# Patient Record
Sex: FEMALE | Race: WHITE | Employment: OTHER | URBAN - METROPOLITAN AREA
[De-identification: names, ages, dates, MRNs, and addresses within clinical notes are randomized per-mention and may not be internally consistent; named-entity substitution may affect disease eponyms.]

---

## 2021-11-19 ENCOUNTER — TELEPHONE (OUTPATIENT)
Dept: FAMILY MEDICINE CLINIC | Age: 76
End: 2021-11-19

## 2021-11-19 NOTE — TELEPHONE ENCOUNTER
I called the office yesterday to make sure this happened and now the appt is gone. The 10:30 appt was just seen today. Move her appt out 2 - 3 weeks and put Alok Devries in that spot.   Thanks

## 2021-11-19 NOTE — TELEPHONE ENCOUNTER
QUESTIONS  Information for Provider? pt's son Rebecca Kendall is calling and needs to   schedule a new pt appt with Dr. Vita Alexander for pt.  He states she text him and told him this was ok to schedule pt for Monday at 11/22 at 11:20      Please advise

## 2021-11-19 NOTE — TELEPHONE ENCOUNTER
----- Message from Lit Baird sent at 11/18/2021  4:24 PM EST -----  Subject: Message to Provider    QUESTIONS  Information for Provider? pt's son Tito Liang is calling and needs to   schedule a new pt appt with Dr. Jose Luis Rose for pt. He states she text him and   told him this was ok to schedule pt for Monday at 11/22 at 11:20  ---------------------------------------------------------------------------  --------------  [address]  What is the best way for the office to contact you? OK to leave message on   voicemail  Preferred Call Back Phone Number? [phone]  ---------------------------------------------------------------------------  --------------  SCRIPT ANSWERS  Relationship to Patient?  Self

## 2021-11-21 NOTE — PROGRESS NOTES
oxy  Subjective:      Patient ID: Faisal Leiva 78 y.o. female. There were no encounter diagnoses. HPI    Sharlot Osgood is presenting as a new patient. She lives in [facility] [place] but can no longer be alone; her son is in process of moving her to [place]. 2 weeks ago she developed pedal edema. Was not eating a high Na.diet. She developed worsening of baseline dyspnea on exertion but no PND, orthopnea, palpitations, chest pain, light headedness, claudication. Her pulse ox has stayed stable in the lower 90s on 3 L NC. Was told she may have had a mild MI in the past.  She never had an angiogram or stent. She had 2 normal stress tests; does not recall last test.  Her cardiologist saw her 2 weeks ago. Did not do EKG. Just listened to her heart, told her to eat less salt and started lasix. Had an echo many years ago; does not know results. She was never told she has CHF. The edema is better but not gone and she is still short of breath  Swelling is a bit better in the am but not gone. Is not taking NSAIDS. Had labs in October that were fine; cardiology ordered follow up labs in a few weeks. Pulse ox at home is stable in the 90s; is on oxygen continuously at home. Has COPD; quit smoking 10 years ago. Has a pulmonologist in [facility] [place]  Weight was up 3 lbs when she saw the cardiologist; is down 6 lbs today compared to the cardiologist scale. Has right lower ext arterial stent placed 10 years ago. She has no claudication. family would chair lift; she has no joint pain or back pain but has trouble getting out of a chair.   Would like transport     Vaccines: she had COVID, flu and pneumococcal.     Outpatient Medications Marked as Taking for the 11/22/21 encounter (Office Visit) with Linus Gaona MD   Medication Sig Dispense Refill    aspirin 81 MG EC tablet Take by mouth      ipratropium-albuterol (DUONEB) 0.5-2.5 (3) MG/3ML SOLN nebulizer solution inhale one vial (3ml) via nebulizer every 6 hours      lisinopril (PRINIVIL;ZESTRIL) 5 MG tablet TAKE ONE TABLET BY MOUTH DAILY      metoprolol tartrate (LOPRESSOR) 25 MG tablet TAKE ONE-HALF TABLET BY MOUTH TWICE DAILY      OXYGEN Oxygen Quantity: 0 Refills: 0 Ordered: 23-Jan-2019 DO Active      simvastatin (ZOCOR) 40 MG tablet TAKE ONE TABLET BY MOUTH AT BEDTIME      spironolactone (ALDACTONE) 25 MG tablet TAKE ONE TABLET BY MOUTH EVERY DAY      alendronate (FOSAMAX) 70 MG tablet TAKE ONE TABLET BY MOUTH ONCE A WEEK      fluticasone-vilanterol (BREO ELLIPTA) 100-25 MCG/INH AEPB inhaler Inhale into the lungs      albuterol sulfate (PROAIR RESPICLICK) 870 (90 Base) MCG/ACT aerosol powder inhalation Inhale 108 mcg into the lungs      isosorbide dinitrate (ISORDIL) 30 MG tablet TAKE ONE TABLET BY MOUTH DAILY      furosemide (LASIX) 20 MG tablet TAKE ONE TABLET BY MOUTH EVERY DAY      magnesium oxide (MAG-OX) 400 MG tablet Take 400 mg by mouth daily          No Known Allergies    Patient Active Problem List   Diagnosis    Primary hypertension    Other emphysema ([address]    Pure hypercholesterolemia    PVD (peripheral vascular disease) ([address]    Age-related osteoporosis without current pathological fracture       Past Medical History:   Diagnosis Date    Age-related osteoporosis without current pathological fracture 11/22/2021    Other emphysema ([address] 11/22/2021    Primary hypertension 11/22/2021    Pure hypercholesterolemia 11/22/2021    PVD (peripheral vascular disease) ([address] 11/22/2021       Past Surgical History:   Procedure Laterality Date    ANKLE FRACTURE SURGERY Left     HIP FRACTURE SURGERY Left     PERIPHERAL PERCUTANEOUS ARTERIAL INTERVENTION Right 2010    lower        Family History   Problem Relation Age of Onset    Heart Attack Mother 46    Heart Failure Mother     Heart Failure Father 80    Coronary Art Dis Brother        Social History     Tobacco Use    Smoking status: Former Smoker     Packs/day: 1.00     Years: failure, unspecified heart failure type [facility])   Jess Gtz MD, [facility] system    Comprehensive Metabolic Panel    CBC    BRAIN NATRIURETIC PEPTIDE (BNP)    TSH with Reflex    ECHO Complete 2D W Doppler W Color    Increase lasix to 40 mg qd; elevate legs every 2 to 3 hours, support hose. Decrease lasix to 20 mg once edema resolved. Daily wts. 2. Primary hypertension  At goal < 130/80 with lisinopril, aldactone, lopressor and lasix. Depending on echo consider consider titrating ACE and BB    3. Other emphysema ([facility]  DME Order for Home Oxygen as OP   4. Pure hypercholesterolemia  Continue statin. 5. PVD (peripheral vascular disease) ([facility])  Continue BP and lipid control. Asymptomatic but not very active. 6. Age-related osteoporosis without current pathological fracture  Continue Fosamax, Ca, D    7. Debilitated patient  Lift Chair MISC    Misc. Devices [id number] [facility]) [facility]    ECHO Complete 2D W Doppler W Color   8. Pedal edema     9. Hypertensive heart disease with heart failure (HCC)   ECHO Complete 2D W Doppler W Color    EKG 12 Lead   10. Chronic hypoxemic respiratory failure ([facility]  DME Order for Home Oxygen as OP   11. Hx smoking - discussed lung cancer screening and information provided. Will consider. Would be high risk for surgical tx. Plan:    old records requested. Side effects of current medications reviewed and questions answered. See me or Dr. Pimentel Service in 2 to 3 weeks. See me in 3 months if sees Dr. Pimentel Service in a few weeks.

## 2021-11-22 ENCOUNTER — OFFICE VISIT (OUTPATIENT)
Dept: FAMILY MEDICINE CLINIC | Age: 76
End: 2021-11-22
Payer: MEDICARE

## 2021-11-22 VITALS
OXYGEN SATURATION: 90 % | WEIGHT: 119.4 LBS | BODY MASS INDEX: 23.44 KG/M2 | SYSTOLIC BLOOD PRESSURE: 110 MMHG | DIASTOLIC BLOOD PRESSURE: 64 MMHG | HEIGHT: 60 IN | TEMPERATURE: 97.3 F | RESPIRATION RATE: 18 BRPM | HEART RATE: 68 BPM

## 2021-11-22 DIAGNOSIS — Z87.891 HISTORY OF SMOKING 30 OR MORE PACK YEARS: ICD-10-CM

## 2021-11-22 DIAGNOSIS — J43.8 OTHER EMPHYSEMA (HCC): ICD-10-CM

## 2021-11-22 DIAGNOSIS — R60.0 PEDAL EDEMA: ICD-10-CM

## 2021-11-22 DIAGNOSIS — I11.0 HYPERTENSIVE HEART DISEASE WITH HEART FAILURE (HCC): ICD-10-CM

## 2021-11-22 DIAGNOSIS — E78.00 PURE HYPERCHOLESTEROLEMIA: ICD-10-CM

## 2021-11-22 DIAGNOSIS — I50.9 ACUTE ON CHRONIC CONGESTIVE HEART FAILURE, UNSPECIFIED HEART FAILURE TYPE (HCC): ICD-10-CM

## 2021-11-22 DIAGNOSIS — I50.9 ACUTE ON CHRONIC CONGESTIVE HEART FAILURE, UNSPECIFIED HEART FAILURE TYPE (HCC): Primary | ICD-10-CM

## 2021-11-22 DIAGNOSIS — I73.9 PVD (PERIPHERAL VASCULAR DISEASE) (HCC): ICD-10-CM

## 2021-11-22 DIAGNOSIS — J96.11 CHRONIC HYPOXEMIC RESPIRATORY FAILURE (HCC): ICD-10-CM

## 2021-11-22 DIAGNOSIS — M81.0 AGE-RELATED OSTEOPOROSIS WITHOUT CURRENT PATHOLOGICAL FRACTURE: ICD-10-CM

## 2021-11-22 DIAGNOSIS — R53.81 DEBILITATED PATIENT: ICD-10-CM

## 2021-11-22 DIAGNOSIS — I10 PRIMARY HYPERTENSION: ICD-10-CM

## 2021-11-22 LAB
A/G RATIO: 1.7 (ref 1.1–2.2)
ALBUMIN SERPL-MCNC: 4.5 G/DL (ref 3.4–5)
ALP BLD-CCNC: 65 U/L (ref 40–129)
ALT SERPL-CCNC: 9 U/L (ref 10–40)
ANION GAP SERPL CALCULATED.3IONS-SCNC: 11 MMOL/L (ref 3–16)
AST SERPL-CCNC: 20 U/L (ref 15–37)
BILIRUB SERPL-MCNC: 0.9 MG/DL (ref 0–1)
BUN BLDV-MCNC: 22 MG/DL (ref 7–20)
CALCIUM SERPL-MCNC: 9.9 MG/DL (ref 8.3–10.6)
CHLORIDE BLD-SCNC: 92 MMOL/L (ref 99–110)
CO2: 36 MMOL/L (ref 21–32)
CREAT SERPL-MCNC: 0.6 MG/DL (ref 0.6–1.2)
GFR AFRICAN AMERICAN: >60
GFR NON-AFRICAN AMERICAN: >60
GLUCOSE BLD-MCNC: 102 MG/DL (ref 70–99)
HCT VFR BLD CALC: 43.1 % (ref 36–48)
HEMOGLOBIN: 13.7 G/DL (ref 12–16)
MCH RBC QN AUTO: 32.1 PG (ref 26–34)
MCHC RBC AUTO-ENTMCNC: 31.6 G/DL (ref 31–36)
MCV RBC AUTO: 101.5 FL (ref 80–100)
PDW BLD-RTO: 15 % (ref 12.4–15.4)
PLATELET # BLD: 222 K/UL (ref 135–450)
PMV BLD AUTO: 10.1 FL (ref 5–10.5)
POTASSIUM SERPL-SCNC: 4.5 MMOL/L (ref 3.5–5.1)
PRO-BNP: 452 PG/ML (ref 0–449)
RBC # BLD: 4.25 M/UL (ref 4–5.2)
SODIUM BLD-SCNC: 139 MMOL/L (ref 136–145)
TOTAL PROTEIN: 7.2 G/DL (ref 6.4–8.2)
TSH REFLEX: 0.98 UIU/ML (ref 0.27–4.2)
WBC # BLD: 8.2 K/UL (ref 4–11)

## 2021-11-22 PROCEDURE — 93000 ELECTROCARDIOGRAM COMPLETE: CPT | Performed by: FAMILY MEDICINE

## 2021-11-22 PROCEDURE — 99204 OFFICE O/P NEW MOD 45 MIN: CPT | Performed by: FAMILY MEDICINE

## 2021-11-22 RX ORDER — FUROSEMIDE 20 MG/1
20-40 TABLET ORAL DAILY
Qty: 180 TABLET | Refills: 0 | Status: ON HOLD | OUTPATIENT
Start: 2021-11-22 | End: 2022-07-01 | Stop reason: SDUPTHER

## 2021-11-22 RX ORDER — FUROSEMIDE 20 MG/1
TABLET ORAL
COMMUNITY
Start: 2021-11-18 | End: 2021-11-22 | Stop reason: SDUPTHER

## 2021-11-22 RX ORDER — IPRATROPIUM BROMIDE AND ALBUTEROL SULFATE 2.5; .5 MG/3ML; MG/3ML
SOLUTION RESPIRATORY (INHALATION)
COMMUNITY
Start: 2021-11-02 | End: 2021-11-22 | Stop reason: SDUPTHER

## 2021-11-22 RX ORDER — LISINOPRIL 5 MG/1
5 TABLET ORAL NIGHTLY
Status: ON HOLD | COMMUNITY
Start: 2021-10-14 | End: 2022-07-01 | Stop reason: HOSPADM

## 2021-11-22 RX ORDER — FLUTICASONE FUROATE AND VILANTEROL 100; 25 UG/1; UG/1
POWDER RESPIRATORY (INHALATION)
COMMUNITY
End: 2022-06-01

## 2021-11-22 RX ORDER — IPRATROPIUM BROMIDE AND ALBUTEROL SULFATE 2.5; .5 MG/3ML; MG/3ML
SOLUTION RESPIRATORY (INHALATION)
Qty: 360 ML | Refills: 5 | Status: SHIPPED | OUTPATIENT
Start: 2021-11-22 | End: 2022-03-14 | Stop reason: SDUPTHER

## 2021-11-22 RX ORDER — ASPIRIN 81 MG/1
TABLET ORAL
COMMUNITY

## 2021-11-22 RX ORDER — WHEELCHAIR
1 EACH MISCELLANEOUS DAILY
Qty: 1 EACH | Refills: 0 | Status: SHIPPED | OUTPATIENT
Start: 2021-11-22 | End: 2022-06-01

## 2021-11-22 RX ORDER — ALENDRONATE SODIUM 70 MG/1
TABLET ORAL
COMMUNITY
Start: 2021-10-11 | End: 2021-12-02 | Stop reason: SDUPTHER

## 2021-11-22 RX ORDER — SPIRONOLACTONE 25 MG/1
TABLET ORAL
COMMUNITY
Start: 2021-09-20 | End: 2022-04-04 | Stop reason: SDUPTHER

## 2021-11-22 RX ORDER — SIMVASTATIN 40 MG
TABLET ORAL
COMMUNITY
Start: 2021-11-01

## 2021-11-22 RX ORDER — MAGNESIUM OXIDE 400 MG/1
400 TABLET ORAL DAILY
COMMUNITY

## 2021-11-22 RX ORDER — ISOSORBIDE DINITRATE 30 MG/1
TABLET ORAL
COMMUNITY
Start: 2021-09-18 | End: 2022-03-15 | Stop reason: SDUPTHER

## 2021-11-22 ASSESSMENT — PATIENT HEALTH QUESTIONNAIRE - PHQ9
SUM OF ALL RESPONSES TO PHQ9 QUESTIONS 1 & 2: 0
1. LITTLE INTEREST OR PLEASURE IN DOING THINGS: 0
SUM OF ALL RESPONSES TO PHQ QUESTIONS 1-9: 0
2. FEELING DOWN, DEPRESSED OR HOPELESS: 0

## 2021-11-22 ASSESSMENT — ENCOUNTER SYMPTOMS
DIARRHEA: 0
SHORTNESS OF BREATH: 1
WHEEZING: 0
CONSTIPATION: 0
COUGH: 0

## 2021-11-22 NOTE — PATIENT INSTRUCTIONS
Patient Education        Learning About Lung Cancer Screening  What is screening for lung cancer? Lung cancer screening is a way to find some lung cancers early, before a person has any symptoms of the cancer. Lung cancer screening may help those who have the highest risk for lung cancer--people age 48 and older who are or were heavy smokers. For most people, who aren't at increased risk, screening for lung cancer probably isn't helpful. Screening won't prevent cancer. And it may not find all lung cancers. Lung cancer screening may lower the risk of dying from lung cancer in a small number of people. How is it done? Lung cancer screening is done with a low-dose CT (computed tomography) scan. A CT scan uses X-rays, or radiation, to make detailed pictures of your body. Experts recommend that screening be done in medical centers that focus on finding and treating lung cancer. Who is screening recommended for? Lung cancer screening is recommended for people age 48 and older who are or were heavy smokers. That means people with a smoking history of at least 20 pack years. A pack year is a way to measure how heavy a smoker you are or were. To figure out your pack years, multiply how many packs a day on average (assuming 20 cigarettes per pack) you have smoked by how many years you have smoked. For example:  · If you smoked 1 pack a day for 20 years, that's 1 times 20. So you have a smoking history of 20 pack years. · If you smoked 2 packs a day for 10 years, that's 2 times 10. So you have a smoking history of 20 pack years. Experts agree that screening is for people who have a high risk of lung cancer. But experts don't agree on what high risk means. Some say people age 48 or older with at least a 20-pack-year smoking history are high risk. Others say it's people age 54 or older with a 30-pack-year history. To see if you could benefit from screening, first find out if you are at high risk for lung cancer. Your doctor can help you decide your lung cancer risk. What are the risks of screening? CT screening for lung cancer isn't perfect. It can show an abnormal result when it turns out there wasn't any cancer. This is called a false-positive result. This means you may need more tests to make sure you don't have cancer. These tests can be harmful and cause a lot of worry. These tests may include more CT scans and invasive testing like a lung biopsy. In a biopsy, the doctor takes a sample of tissue from inside your lung so it can be looked at under a microscope. A biopsy is the only way to tell if you have lung cancer. If the biopsy finds cancer, you and your doctor will have to decide how or whether to treat it. Some lung cancers found on CT scans are harmless and would not have caused a problem if they had not been found through screening. But because doctors can't tell which ones will turn out to be harmless, most will be treated. This means that you may get treatment--including surgery, radiation, or chemotherapy--that you don't need. There is a risk of damage to cells or tissue from being exposed to radiation, including the small amounts used in CTs, X-rays, and other medical tests. Over time, exposure to radiation may cause cancer and other health problems. But in most cases, the risk of getting cancer from being exposed to small amounts of radiation is low. It's not a reason to avoid these tests for most people. What are the benefits of screening? Your scan may be normal (negative). For some people who are at higher risk, screening lowers the chance of dying of lung cancer. How much and how long you smoked helps to determine your risk level. Screening can find some cancers early, when treatment may be more likely to work. What happens after screening? The results of your CT scan will be sent to your doctor.  Someone from your care team will explain the results of your scan and answer any questions you may have. If you need any follow-up, he or she will help you understand what to do next. After a lung cancer screening, you can go back to your usual activities right away. A lung cancer screening test can't tell if you have lung cancer. If your results are positive, your doctor can't tell whether an abnormal finding is a harmless nodule, cancer, or something else without doing more tests. What can you do to help prevent lung cancer? Some lung cancers can't be prevented. But if you smoke, quitting smoking is the best step you can take to prevent lung cancer. If you want to quit, your doctor can recommend medicines or other ways to help. Follow-up care is a key part of your treatment and safety. Be sure to make and go to all appointments, and call your doctor if you are having problems. It's also a good idea to know your test results and keep a list of the medicines you take. Where can you learn more? Go to https://[employer]. org and sign in to your [employer] account. Enter T576 in the weeSPIN box to learn more about \"Learning About Lung Cancer Screening. \"     If you do not have an account, please click on the \"Sign Up Now\" link. Current as of: December 17, 2020               Content Version: 13.0  © 2006-2021 [employer]wise, Inc[employer]. Care instructions adapted under license by [facility] ([facility]). If you have questions about a medical condition or this instruction, always ask your healthcare professional. [place] [address] any warranty or liability for your use of this information.

## 2021-12-01 ENCOUNTER — PATIENT MESSAGE (OUTPATIENT)
Dept: FAMILY MEDICINE CLINIC | Age: 76
End: 2021-12-01

## 2021-12-02 RX ORDER — ALENDRONATE SODIUM 70 MG/1
TABLET ORAL
Qty: 4 TABLET | Refills: 3 | Status: SHIPPED | OUTPATIENT
Start: 2021-12-02 | End: 2022-04-01 | Stop reason: SDUPTHER

## 2021-12-02 NOTE — TELEPHONE ENCOUNTER
From: Mian De Anda  To: Dr. Taylor Raid: 12/1/2021 1:35 PM EST  Subject: Caterinaxaiver Amaro, thanks for the reply. The swelling is better. She took the 2 pills for a week and now is back down to one. She asked me to see if she could get a refill on her Alendronate 70mg and her Breo 100-25 mcg. If there is a better process for me to go through, let me know. I didn't realize she needed any refills when we were at your office. We use the [facility] in [place] on [address] Thank you!   Melissa Silveira

## 2021-12-08 ENCOUNTER — HOSPITAL ENCOUNTER (OUTPATIENT)
Dept: NON INVASIVE DIAGNOSTICS | Age: 76
Discharge: HOME | End: 2021-12-08
Payer: MEDICARE

## 2021-12-08 DIAGNOSIS — I11.0 HYPERTENSIVE HEART DISEASE WITH HEART FAILURE (HCC): ICD-10-CM

## 2021-12-08 DIAGNOSIS — J43.8 OTHER EMPHYSEMA (HCC): Primary | ICD-10-CM

## 2021-12-08 DIAGNOSIS — I50.9 ACUTE ON CHRONIC CONGESTIVE HEART FAILURE, UNSPECIFIED HEART FAILURE TYPE (HCC): ICD-10-CM

## 2021-12-08 DIAGNOSIS — R53.81 DEBILITATED PATIENT: ICD-10-CM

## 2021-12-08 LAB
LV EF: 60 %
LVEF MODALITY: NORMAL

## 2021-12-08 PROCEDURE — C8929 TTE W OR WO FOL WCON,DOPPLER: HCPCS

## 2021-12-09 ENCOUNTER — TELEPHONE (OUTPATIENT)
Dept: PULMONOLOGY | Age: 76
End: 2021-12-09

## 2021-12-09 DIAGNOSIS — J43.2 CENTRILOBULAR EMPHYSEMA (HCC): Primary | ICD-10-CM

## 2021-12-09 NOTE — TELEPHONE ENCOUNTER
Referred By: Angel Jama MD    Reason: emphysema  Previous Testing: echo 12/8/21    Insurance: [facility]    New Testing Needed: pft and cxr pended if appropriate     Appt Date/Time: 1/20/22 @ 10am

## 2022-01-04 ENCOUNTER — HOSPITAL ENCOUNTER (OUTPATIENT)
Dept: PULMONOLOGY | Age: 77
Discharge: HOME | End: 2022-01-04
Payer: MEDICARE

## 2022-01-04 ENCOUNTER — HOSPITAL ENCOUNTER (OUTPATIENT)
Dept: GENERAL RADIOLOGY | Age: 77
Discharge: HOME | End: 2022-01-04
Payer: MEDICARE

## 2022-01-04 VITALS — OXYGEN SATURATION: 94 %

## 2022-01-04 DIAGNOSIS — J43.2 CENTRILOBULAR EMPHYSEMA (HCC): ICD-10-CM

## 2022-01-04 PROCEDURE — 94060 EVALUATION OF WHEEZING: CPT

## 2022-01-04 PROCEDURE — 71046 X-RAY EXAM CHEST 2 VIEWS: CPT

## 2022-01-04 PROCEDURE — 94664 DEMO&/EVAL PT USE INHALER: CPT

## 2022-01-04 PROCEDURE — 94760 N-INVAS EAR/PLS OXIMETRY 1: CPT

## 2022-01-04 PROCEDURE — 6360000002 HC RX W HCPCS: Performed by: INTERNAL MEDICINE

## 2022-01-04 PROCEDURE — 94726 PLETHYSMOGRAPHY LUNG VOLUMES: CPT

## 2022-01-04 PROCEDURE — 94729 DIFFUSING CAPACITY: CPT

## 2022-01-04 RX ORDER — ALBUTEROL SULFATE 2.5 MG/3ML
2.5 SOLUTION RESPIRATORY (INHALATION) ONCE
Status: COMPLETED | OUTPATIENT
Start: 2022-01-04 | End: 2022-01-04

## 2022-01-04 RX ADMIN — ALBUTEROL SULFATE 2.5 MG: 2.5 SOLUTION RESPIRATORY (INHALATION) at 10:29

## 2022-01-11 ENCOUNTER — OFFICE VISIT (OUTPATIENT)
Dept: CARDIOLOGY CLINIC | Age: 77
End: 2022-01-11
Payer: MEDICARE

## 2022-01-11 VITALS
HEIGHT: 60 IN | HEART RATE: 98 BPM | SYSTOLIC BLOOD PRESSURE: 84 MMHG | OXYGEN SATURATION: 98 % | WEIGHT: 110.5 LBS | DIASTOLIC BLOOD PRESSURE: 52 MMHG | BODY MASS INDEX: 21.69 KG/M2

## 2022-01-11 DIAGNOSIS — I50.32 CHRONIC DIASTOLIC HEART FAILURE (HCC): Primary | ICD-10-CM

## 2022-01-11 DIAGNOSIS — I10 ESSENTIAL HYPERTENSION, BENIGN: ICD-10-CM

## 2022-01-11 DIAGNOSIS — I73.9 PAD (PERIPHERAL ARTERY DISEASE) (HCC): ICD-10-CM

## 2022-01-11 DIAGNOSIS — J43.9 PULMONARY EMPHYSEMA, UNSPECIFIED EMPHYSEMA TYPE (HCC): ICD-10-CM

## 2022-01-11 DIAGNOSIS — I47.1 PAT (PAROXYSMAL ATRIAL TACHYCARDIA) (HCC): ICD-10-CM

## 2022-01-11 DIAGNOSIS — E78.00 PURE HYPERCHOLESTEROLEMIA: ICD-10-CM

## 2022-01-11 DIAGNOSIS — I25.10 CORONARY ARTERY DISEASE INVOLVING NATIVE HEART WITHOUT ANGINA PECTORIS, UNSPECIFIED VESSEL OR LESION TYPE: ICD-10-CM

## 2022-01-11 PROCEDURE — 99204 OFFICE O/P NEW MOD 45 MIN: CPT | Performed by: INTERNAL MEDICINE

## 2022-01-11 RX ORDER — NITROGLYCERIN 0.4 MG/1
0.4 TABLET SUBLINGUAL EVERY 5 MIN PRN
COMMUNITY

## 2022-01-11 NOTE — PROGRESS NOTES
[address]  Advanced CHF/Pulmonary Hypertension   Cardiac Evaluation      Joana Campos  YOB: 1945    Date of visit: 1/11/22    Chief Complaint   Patient presents with    New Patient    Edema    Shortness of Breath          History of Present Illness:  Joana Campos is a 78 y.o. female who presents from referral from Lizzie Ramirez MD for consultation and management of acute on chronic congestive heart failure. Patient has a past medical history of PAT, PAD,  HTN and COPD w/ emphysema (on home O2). Of note, patient wore a cardiac event monitor in 7/2019 that showed predominately ST. Echo 12/2021 demonstrated an LVEF of 60%,  indeterminate diastolic function. There is an atrial septal aneurysm seen. Trivial mitral regurgitation. Mild aortic regurgitation. Moderate tricuspid regurgitation. Trivial pulmonic regurgitation present. Estimated pulmonary artery systolic pressure is at 69 mmHg assuming a right   atrial pressure of 3 mmHg. A bubble study was performed and shows evidence of right to left shunting consistent with a patent foramen ovale or atrial septal defect. Today she reports she is in the process of moving to [place]. She reports her PCP Lizzie Ramirez MD prescribed her lasix for increased BLE edema that has helped decrease swelling. Patient denies current edema, chest pain, palpitations, dizziness or syncope. Patient is taking all cardiac medications as prescribed and tolerates them well. She reports weight loss related to the lasix and spironolactone. She reports getting an Echo recently. She reports her previous Md ordered stress tests every 2 years. She reports she can not tolerate an exercise stress test. She reports she wears nasal cannula oxygen as needed at home. She reports shortness of breath even when wearing oxygen.        No Known Allergies  Current Outpatient Medications   Medication Sig Dispense Refill    nitroGLYCERIN (NITROSTAT) 0.4 MG SL tablet Place 0.4 mg under the tongue every 5 minutes as needed for Chest pain up to max of 3 total doses. If no relief after 1 dose, call 911.  alendronate (FOSAMAX) 70 MG tablet TAKE ONE TABLET BY MOUTH ONCE A WEEK 4 tablet 3    aspirin 81 MG EC tablet Take by mouth      lisinopril (PRINIVIL;ZESTRIL) 5 MG tablet TAKE ONE TABLET BY MOUTH DAILY      metoprolol tartrate (LOPRESSOR) 25 MG tablet TAKE ONE-HALF TABLET BY MOUTH TWICE DAILY      OXYGEN Oxygen Quantity: 0 Refills: 0 Ordered: 23-Jan-2019 DO Active      simvastatin (ZOCOR) 40 MG tablet TAKE ONE TABLET BY MOUTH AT BEDTIME      spironolactone (ALDACTONE) 25 MG tablet TAKE ONE TABLET BY MOUTH EVERY DAY      fluticasone-vilanterol (BREO ELLIPTA) 100-25 MCG/INH AEPB inhaler Inhale into the lungs      albuterol sulfate (PROAIR RESPICLICK) 340 (90 Base) MCG/ACT aerosol powder inhalation Inhale 108 mcg into the lungs      isosorbide dinitrate (ISORDIL) 30 MG tablet TAKE ONE TABLET BY MOUTH DAILY      magnesium oxide (MAG-OX) 400 MG tablet Take 400 mg by mouth daily      ipratropium-albuterol (DUONEB) 0.5-2.5 (3) MG/3ML SOLN nebulizer solution inhale one vial (3ml) via nebulizer every 6 hours 360 mL 5    furosemide (LASIX) 20 MG tablet Take 1-2 tablets by mouth daily (Patient taking differently: Take 20 mg by mouth See Admin Instructions Patient taking 1 tab daily, then 2 tabs daily, every other day) 180 tablet 0    Lift Chair MISC by Does not apply route 1 each 0    Misc. Devices [facility]) MISC 1 each by Does not apply route daily 1 each 0     No current facility-administered medications for this visit.        Past Medical History:   Diagnosis Date    Age-related osteoporosis without current pathological fracture 11/22/2021    Other emphysema ([address] 11/22/2021    Primary hypertension 11/22/2021    Pure hypercholesterolemia 11/22/2021    PVD (peripheral vascular disease) ([address] 11/22/2021     Past Surgical History:   Procedure Laterality Date    ANKLE FRACTURE SURGERY Left     HIP FRACTURE SURGERY Left     PERIPHERAL PERCUTANEOUS ARTERIAL INTERVENTION Right 2010    lower     Family History   Problem Relation Age of Onset    Heart Attack Mother 46    Heart Failure Mother     Heart Failure Father 80    Coronary Art Dis Brother      Social History     Socioeconomic History    Marital status:      Spouse name: Not on file    Number of children: Not on file    Years of education: Not on file    Highest education level: Not on file   Occupational History    Not on file   Tobacco Use    Smoking status: Former Smoker     Packs/day: 1.00     Years: 40.00     Pack years: 40.00     Start date:      Quit date:      Years since quittin.0    Smokeless tobacco: Never Used   Substance and Sexual Activity    Alcohol use: Yes     Alcohol/week: 7.0 standard drinks     Types: 7 Standard drinks or equivalent per week     Comment: one daily    Drug use: Never    Sexual activity: Not on file   Other Topics Concern    Not on file   Social History Narrative    Not on file     Social Determinants of Health     Financial Resource Strain:     Difficulty of Paying Living Expenses: Not on file   Food Insecurity:     Worried About Running Out of Food in the Last Year: Not on file    Ale of Food in the Last Year: Not on file   Transportation Needs:     Lack of Transportation (Medical): Not on file    Lack of Transportation (Non-Medical):  Not on file   Physical Activity:     Days of Exercise per Week: Not on file    Minutes of Exercise per Session: Not on file   Stress:     Feeling of Stress : Not on file   Social Connections:     Frequency of Communication with Friends and Family: Not on file    Frequency of Social Gatherings with Friends and Family: Not on file    Attends Faith Services: Not on file    Active Member of Clubs or Organizations: Not on file    Attends Club or Organization Meetings: Not on file    Marital Status: Not on file   Intimate Partner Violence:     Fear of Current or Ex-Partner: Not on file    Emotionally Abused: Not on file    Physically Abused: Not on file    Sexually Abused: Not on file   Housing Stability:     Unable to Pay for Housing in the Last Year: Not on file    Number of [place] in the Last Year: Not on file    Unstable Housing in the Last Year: Not on file       Review of Systems:   · Constitutional: there has been no unanticipated weight loss. There's been no change in energy level, sleep pattern, or activity level. · Eyes: No visual changes or diplopia. No scleral icterus. · ENT: No Headaches, hearing loss or vertigo. No mouth sores or sore throat. · Cardiovascular: Reviewed in HPI  · Respiratory: No cough or wheezing, no sputum production. No hematemesis. · Gastrointestinal: No abdominal pain, appetite loss, blood in stools. No change in bowel or bladder habits. · Genitourinary: No dysuria, trouble voiding, or hematuria. · Musculoskeletal:  No gait disturbance, weakness or joint complaints. · Integumentary: No rash or pruritis. · Neurological: No headache, diplopia, change in muscle strength, numbness or tingling. No change in gait, balance, coordination, mood, affect, memory, mentation, behavior. · Psychiatric: No anxiety, no depression. · Endocrine: No malaise, fatigue or temperature intolerance. No excessive thirst, fluid intake, or urination. No tremor. · Hematologic/Lymphatic: No abnormal bruising or bleeding, blood clots or swollen lymph nodes. · Allergic/Immunologic: No nasal congestion or hives. Physical Examination:    Vitals:    01/11/22 1424 01/11/22 1433   BP: (!) 84/50 (!) 84/52   Pulse: 98    SpO2: 98%    Weight: 110 lb 8 oz (50.1 kg)    Height: 5' (1.524 m)      Body mass index is 21.58 kg/m².      Wt Readings from Last 3 Encounters:   01/11/22 110 lb 8 oz (50.1 kg)   11/22/21 119 lb 6.4 oz (54.2 kg)     BP Readings from Last 3 Encounters: 01/11/22 (!) 84/52   11/22/21 110/64          Constitutional and General Appearance:   WD/WN in NAD  HEENT:  NC/AT  BIANKA  No problems with hearing  Skin:  Warm, dry  Respiratory:  · Normal excursion and expansion without use of accessory muscles  · Resp Auscultation: Normal breath sounds without dullness  Cardiovascular:  · The apical impulses not displaced  · Heart tones are crisp and normal  · Cervical veins are not engorged  · The carotid upstroke is normal in amplitude and contour without delay or bruit  · JVP less than 8 cm H2O  RRR with nl S1 and S2 without m,r,g  · Peripheral pulses are symmetrical and full  · There is no clubbing, cyanosis of the extremities. · No edema  · Femoral Arteries: 2+ and equal  · Pedal Pulses: 2+ and equal   Neck:  · No thyromegaly  Abdomen:  · No masses or tenderness  · Liver/Spleen: No Abnormalities Noted  Neurological/Psychiatric:  · Alert and oriented in all spheres  · Moves all extremities well  · Exhibits normal gait balance and coordination  · No abnormalities of mood, affect, memory, mentation, or behavior are noted    Labs were reviewed including labs from other hospital systems through [facility]. Cardiac testing was reviewed including echos, nuclear scans, cardiac catheterization, including from other hospital systems through [facility]. Assessment:      1. Chronic diastolic heart failure ([address]    2. Pure hypercholesterolemia    3. Coronary artery disease involving native heart without angina pectoris, unspecified vessel or lesion type    4. Pulmonary emphysema, unspecified emphysema type ([address]    5. Essential hypertension, benign    6. PAT (paroxysmal atrial tachycardia) ([address]    7. PAD (peripheral artery disease) ([address]          Plan:  1. Continue current cardiac medications  2. Labs: BNP, BMP   - Closest lab to you is [address]   3. Follow up with me in March 2022  4.  Decrease furosemide to 20 mg daily    - I will notify you if any other changes in medications  5. Recommend weighing yourself at the same time everyday and keeping a log of this    - Call the office if you notice a decrease in weight of 3 lbs in one day    QUALITY MEASURES  1. Tobacco Cessation Counseling: Yes  2. Retake of BP if >140/90:   NA  3. Documentation to PCP/referring for new patient:  Sent to PCP at close of office visit  4. CAD patient on anti-platelet: NA  5. CAD patient on STATIN therapy:  Yes  6. Patient with CHF and aFib on anticoagulation:  NA     This note was scribed in the presence of Mishel Haines MD by Floyd Perdomo RN. The scribe's documentation has been prepared under my direction and personally reviewed by me in its entirety. I confirm that the note above accurately reflects all work, treatment, procedures, and medical decision making performed by me. Time Based Itemization  A total of 60 minutes was spent on today's patient encounter. If applicable, non-patient-facing activities:  ( x)Preparing to see the patient and reviewing records  ( ) Individual interpretation of results  ( ) Discussion or coordination of care with other health care professionals  ( x) Ordering of unique tests, medications, or procedures  ( x) Documentation within the EHR      I appreciate the opportunity of cooperating in the care of this patient.     Lorena Easton M.D., [facility]

## 2022-01-11 NOTE — PATIENT INSTRUCTIONS
Plan:  1. Continue current cardiac medications  2. Labs: BNP, BMP   - Closest lab to you is [address]   3. Follow up with me in March 2022  4. Decrease furosemide to 20 mg daily    - I will notify you if any other changes in medications  5.  Recommend weighing yourself at the same time everyday and keeping a log of this    - Call the office if you notice a decrease in weight of 3 lbs in one day

## 2022-01-18 DIAGNOSIS — I25.10 CORONARY ARTERY DISEASE INVOLVING NATIVE HEART WITHOUT ANGINA PECTORIS, UNSPECIFIED VESSEL OR LESION TYPE: ICD-10-CM

## 2022-01-18 DIAGNOSIS — I50.32 CHRONIC DIASTOLIC HEART FAILURE (HCC): ICD-10-CM

## 2022-01-18 LAB
ANION GAP SERPL CALCULATED.3IONS-SCNC: 12 MMOL/L (ref 3–16)
BUN BLDV-MCNC: 33 MG/DL (ref 7–20)
CALCIUM SERPL-MCNC: 9.9 MG/DL (ref 8.3–10.6)
CHLORIDE BLD-SCNC: 96 MMOL/L (ref 99–110)
CO2: 29 MMOL/L (ref 21–32)
CREAT SERPL-MCNC: 0.9 MG/DL (ref 0.6–1.2)
GFR AFRICAN AMERICAN: >60
GFR NON-AFRICAN AMERICAN: >60
GLUCOSE BLD-MCNC: 99 MG/DL (ref 70–99)
POTASSIUM SERPL-SCNC: 5.4 MMOL/L (ref 3.5–5.1)
PRO-BNP: 440 PG/ML (ref 0–449)
SODIUM BLD-SCNC: 137 MMOL/L (ref 136–145)

## 2022-01-19 NOTE — PROCEDURES
[address]               [address]                               PULMONARY FUNCTION    PATIENT NAME: Jillian Wright                   :        1945  MED REC NO:   [id number]                          ROOM:  ACCOUNT NO:   [de-identified]                           ADMIT DATE: 2022  PROVIDER:     Morena Larsen MD    DATE OF PROCEDURE:  2022    INDICATIONS:  1. Centrilobular emphysema. 2.  BMI of 21. TOBACCO HISTORY:  Patient smoked one pack of cigarettes a day for 40  years and quit 10 years ago. Spirometry data is acceptable and reproducible. Lung volumes were performed via plethysmography. Diffusion capacity is not adjusted for hemoglobin. Oxygen saturation is 94% on her chronic 3 liters of oxygen. SPIROMETRY:  FEV1 to FVC ratio is 31%. Prebronchodilator FEV1 is 0.53,  which is 29% of predicted. Prebronchodilator FVC is 1.7, which is 70%  of predicted. Lung volumes showed total lung capacity of 5.57, which is 122% of  predicted. Residual volume is 3.92, which is 185% of predicted. Diffusion capacity is 6.5, which is 32% of predicted. IMPRESSION:  1. Severe obstructive defect is present. 2.  There is no significant response to bronchodilators. 3.  Air trapping and hyperinflation are present. 4.  There is a severe decrease in diffusion capacity. 5. PFTs are consistent with very severe COPD. In the right clinical  situation, patient could be a candidate for endobronchial valves.         Prerna Valdez MD    D: 2022 15:39:41       T: 2022 23:04:29     [email or web address]  Job#: [id number]     Doc#: [id number]    CC:

## 2022-01-20 ENCOUNTER — TELEPHONE (OUTPATIENT)
Dept: CARDIOLOGY CLINIC | Age: 77
End: 2022-01-20

## 2022-01-20 ENCOUNTER — OFFICE VISIT (OUTPATIENT)
Dept: PULMONOLOGY | Age: 77
End: 2022-01-20
Payer: MEDICARE

## 2022-01-20 VITALS
RESPIRATION RATE: 16 BRPM | DIASTOLIC BLOOD PRESSURE: 67 MMHG | BODY MASS INDEX: 21.79 KG/M2 | OXYGEN SATURATION: 93 % | TEMPERATURE: 96.4 F | SYSTOLIC BLOOD PRESSURE: 95 MMHG | HEART RATE: 118 BPM | WEIGHT: 111 LBS | HEIGHT: 60 IN

## 2022-01-20 DIAGNOSIS — J43.2 CENTRILOBULAR EMPHYSEMA (HCC): Primary | ICD-10-CM

## 2022-01-20 DIAGNOSIS — I50.9 CHRONIC CONGESTIVE HEART FAILURE, UNSPECIFIED HEART FAILURE TYPE (HCC): Primary | ICD-10-CM

## 2022-01-20 PROCEDURE — 99204 OFFICE O/P NEW MOD 45 MIN: CPT | Performed by: INTERNAL MEDICINE

## 2022-01-20 RX ORDER — FLUTICASONE FUROATE, UMECLIDINIUM BROMIDE AND VILANTEROL TRIFENATATE 200; 62.5; 25 UG/1; UG/1; UG/1
1 POWDER RESPIRATORY (INHALATION) DAILY
Qty: 3 EACH | Refills: 3 | Status: SHIPPED | OUTPATIENT
Start: 2022-01-20

## 2022-01-20 NOTE — PROGRESS NOTES
[facility]    Outpatient Initial Note    Subjective:   Referring Physician: Self    CHIEF COMPLAINT / HPI:     The patient is 78 y.o. female who presents today for a new patient visit for COPD. Patient [facility] [place] [facility] to [place] to be closer to her son after her  passed away. Her son is a pediatrician here in the area. Patient has a long history of smoking and history of COPD. She was followed by pulmonologist in [facility] and was on Breo and duo nebs as an outpatient. She reports that she gets dyspneic with exertion and she is on 2 L of oxygen continuously. Past Medical History:    Past Medical History:   Diagnosis Date    Age-related osteoporosis without current pathological fracture 2021    Other [facility] 2021    Primary hypertension 2021    Pure hypercholesterolemia 2021    PVD (peripheral vascular disease) ([facility] 2021       Social History:    Social History     Tobacco Use   Smoking Status Former Smoker    Packs/day: 1.00    Years: 40.00    Pack years: 40.00    Start date:     Quit date:     Years since quittin.0   Smokeless Tobacco Never Used       Family History:  Family History   Problem Relation Age of Onset    Heart Attack Mother 46    Heart Failure Mother     Heart Failure Father 80    Coronary Art Dis Brother      Current Medications:  Current Outpatient Medications on File Prior to Visit   Medication Sig Dispense Refill    nitroGLYCERIN (NITROSTAT) 0.4 MG SL tablet Place 0.4 mg under the tongue every 5 minutes as needed for Chest pain up to max of 3 total doses. If no relief after 1 dose, call 911.       alendronate (FOSAMAX) 70 MG tablet TAKE ONE TABLET BY MOUTH ONCE A WEEK 4 tablet 3    aspirin 81 MG EC tablet Take by mouth      lisinopril (PRINIVIL;ZESTRIL) 5 MG tablet TAKE ONE TABLET BY MOUTH DAILY      metoprolol tartrate (LOPRESSOR) 25 MG tablet TAKE ONE-HALF TABLET BY MOUTH TWICE DAILY      OXYGEN Oxygen Quantity: 0 Refills: 0 Ordered: 23-Jan-2019 DO Active      simvastatin (ZOCOR) 40 MG tablet TAKE ONE TABLET BY MOUTH AT BEDTIME      spironolactone (ALDACTONE) 25 MG tablet TAKE ONE TABLET BY MOUTH EVERY DAY      fluticasone-vilanterol (BREO ELLIPTA) 100-25 MCG/INH AEPB inhaler Inhale into the lungs      albuterol sulfate (PROAIR RESPICLICK) 448 (90 Base) MCG/ACT aerosol powder inhalation Inhale 108 mcg into the lungs      isosorbide dinitrate (ISORDIL) 30 MG tablet TAKE ONE TABLET BY MOUTH DAILY      magnesium oxide (MAG-OX) 400 MG tablet Take 400 mg by mouth daily      Lift Chair MISC by Does not apply route 1 each 0    Misc. Devices [facility]) MISC 1 each by Does not apply route daily 1 each 0    ipratropium-albuterol (DUONEB) 0.5-2.5 (3) MG/3ML SOLN nebulizer solution inhale one vial (3ml) via nebulizer every 6 hours 360 mL 5    furosemide (LASIX) 20 MG tablet Take 1-2 tablets by mouth daily (Patient taking differently: Take 20 mg by mouth See Admin Instructions Patient taking 1 tab daily, then 2 tabs daily, every other day) 180 tablet 0     No current facility-administered medications on file prior to visit.        Allergies:  No Known Allergies    REVIEW OF SYSTEMS:    CONSTITUTIONAL: Negative for fevers and chills  HEENT: Negative for oropharyngeal exudate, post nasal drip, sinus pain / pressure, nasal congestion, ear pain  RESPIRATORY:  See HPI  CARDIOVASCULAR: Negative for chest pain, palpitations, edema  GASTROINTESTINAL: Negative for nausea, vomiting, diarrhea, constipation and abdominal pain  HEMATOLOGICAL: Negative for adenopathy  SKIN: Negative for clubbing, cyanosis, skin lesions  EXTREMITIES: Negative for weakness, decreased ROM  NEUROLOGICAL: Negative for unilateral weakness, speech or gait abnormalities  PSYCH: Negative for anxiety, depression    Objective:   PHYSICAL EXAM:        VITALS:  BP 95/67 (Site: Left Upper Arm, Position: Sitting, Cuff Size: Medium Adult)   Pulse 118   Temp 96.4 °F (35.8 °C) (Infrared)   Resp 16   Ht 5' (1.524 m)   Wt 111 lb (50.3 kg)   SpO2 93% Comment: on3L  Breastfeeding No   BMI 21.68 kg/m²     CONSTITUTIONAL:  Awake, alert, cooperative, no apparent distress, and appears stated age  HEENT: No oropharyngeal exudate, PERRL, no cervical adenopathy, no tracheal deviation, thyroid size normal  LUNGS:  No increased work of breathing and clear to auscultation, no crackles or wheezing   CARDIOVASCULAR:  normal S1 and S2 and no JVD  ABDOMEN:  Normal bowel sounds, non-distended and non-tender to palpation  EXT: No edema, no calf tenderness. Pulses are present bilaterally. NEUROLOGIC:  Mental Status Exam:  Level of Alertness:   awake  Orientation:   person, place, time. SKIN:  normal skin color, texture, turgor, no redness, warmth, or swelling     DATA:      Radiology Review:  Pertinent images / reports were reviewed as a part of this visit. Chest xray reveals the following:  emphysema    Last PFTs:  2021:IMPRESSION:  1. Severe obstructive defect is present. 2.  There is no significant response to bronchodilators. 3.  Air trapping and hyperinflation are present. 4.  There is a severe decrease in diffusion capacity. 5. PFTs are consistent with very severe COPD. In the right clinical  situation, patient could be a candidate for endobronchial valves. Immunizations:   Immunization History   Administered Date(s) Administered    COVID-19, Pfizer Purple top, DILUTE for use, 12+ yrs, 30mcg/0.3mL dose 02/03/2021, 03/03/2021, 03/26/2021, 10/03/2021    Influenza Virus Vaccine 10/20/2021    Pneumococcal Vaccine 10/20/2021     ECHO:  Summary   Normal left ventricle size, wall thickness, and systolic function with an   estimated ejection fraction of >60%. No regional wall motion abnormalities are seen. Indeterminate diastolic function. There is an atrial septal aneurysm seen. Trivial mitral regurgitation.    Mild aortic regurgitation. Moderate tricuspid regurgitation. Trivial pulmonic regurgitation present. Estimated pulmonary artery systolic pressure is at 69 mmHg assuming a right   atrial pressure of 3 mmHg. A bubble study was performed and shows evidence of right to left shunting   consistent with a patent foramen ovale or atrial septal defect. Assessment: This is a 78 y.o. female with severe to very severe COPD with emphysema and chronic hypoxemic respiratory failure    Plan:   -COPD seems reasonably well controlled on Breo and duo nebs but she does need to use her rescue inhaler with some frequency. She is also only using the duo nebs 3 times a day so she is getting under treated with anticholinergic. I do not have any information about her insurance but if John Mendoza is covered in all likelihood, Trelegy is as well so I placed an order for Trelegy 200s. If she is able to get that she can change the duo nebs to as needed. Patient would do well with pulmonary rehab. She is fully vaccinated. Her PFTs make her a reasonable candidate to consider endobronchial valve therapy so I did give them a pamphlet pertaining to this. Patient quit smoking under 15 years ago so would be a candidate for screening CTs depending on when her most recent was done through her primary pulmonologist.  If they are interested in pursuing endobronchial valves I could order a static CT. We will address this at the next visit in 3 months.  -   Orders Placed This Encounter   Medications    Fluticasone-Umeclidin-Vilant (Zenia Monique ELLIPTA) 200-62.5-25 MCG/INH AEPB     Sig: Inhale 1 puff into the lungs daily     Dispense:  3 each     Refill:  3         Diagnosis Orders   1. Centrilobular emphysema ([facility]  Ambulatory referral to Pulmonary Rehab      - Tobacco use: The patient is not currently smoking.       More than half the time of this 35 minute visit was spent counseling the patient.    - RTC 3 months w/ MD. Call or RTC sooner if

## 2022-01-20 NOTE — TELEPHONE ENCOUNTER
----- Message from Omid Starks MD sent at 1/18/2022 10:22 PM EST -----  Call patient. Discuss high potassium foods to avoid. Repeat bmp and bnp in 1 month. Amada Azevedo, your potassium is high. I want you to reduce potassium in your diet. I will have the office call you to discuss.   Omid Starks

## 2022-01-25 NOTE — TELEPHONE ENCOUNTER
Spoke with pt. Pt stated that she reviewed foods that are high in potassium, and will avoid them if possible. Agreed to [facility] in one month.

## 2022-01-25 NOTE — TELEPHONE ENCOUNTER
Spoke with Daughter in [place], no HIPAA form on file. LM requesting Luis Manuel Mcgraw to contact the office.

## 2022-02-25 ENCOUNTER — PATIENT MESSAGE (OUTPATIENT)
Dept: PULMONOLOGY | Age: 77
End: 2022-02-25

## 2022-02-25 NOTE — TELEPHONE ENCOUNTER
From: Georgitatianna Gerardo  To: Dr. Maximino Abebe  Sent: 2/25/2022 1:06 PM EST  Subject: prescription for nebulizer kits    would you please send a prescription to [address] for the nebulizer kits. Please acknowledge when this is sent.  Thank you Georgi Gerardo

## 2022-03-03 DIAGNOSIS — I50.9 CHRONIC CONGESTIVE HEART FAILURE, UNSPECIFIED HEART FAILURE TYPE (HCC): ICD-10-CM

## 2022-03-03 LAB
ANION GAP SERPL CALCULATED.3IONS-SCNC: 12 MMOL/L (ref 3–16)
BUN BLDV-MCNC: 27 MG/DL (ref 7–20)
CALCIUM SERPL-MCNC: 8.9 MG/DL (ref 8.3–10.6)
CHLORIDE BLD-SCNC: 97 MMOL/L (ref 99–110)
CO2: 29 MMOL/L (ref 21–32)
CREAT SERPL-MCNC: 0.8 MG/DL (ref 0.6–1.2)
GFR AFRICAN AMERICAN: >60
GFR NON-AFRICAN AMERICAN: >60
GLUCOSE BLD-MCNC: 114 MG/DL (ref 70–99)
POTASSIUM SERPL-SCNC: 4.5 MMOL/L (ref 3.5–5.1)
PRO-BNP: 522 PG/ML (ref 0–449)
SODIUM BLD-SCNC: 138 MMOL/L (ref 136–145)

## 2022-03-12 ENCOUNTER — PATIENT MESSAGE (OUTPATIENT)
Dept: FAMILY MEDICINE CLINIC | Age: 77
End: 2022-03-12

## 2022-03-14 RX ORDER — IPRATROPIUM BROMIDE AND ALBUTEROL SULFATE 2.5; .5 MG/3ML; MG/3ML
SOLUTION RESPIRATORY (INHALATION)
Qty: 360 ML | Refills: 5 | Status: SHIPPED | OUTPATIENT
Start: 2022-03-14 | End: 2022-04-01 | Stop reason: SDUPTHER

## 2022-04-01 ENCOUNTER — PATIENT MESSAGE (OUTPATIENT)
Dept: CARDIOLOGY CLINIC | Age: 77
End: 2022-04-01

## 2022-04-01 ENCOUNTER — PATIENT MESSAGE (OUTPATIENT)
Dept: PULMONOLOGY | Age: 77
End: 2022-04-01

## 2022-04-01 RX ORDER — ALENDRONATE SODIUM 70 MG/1
TABLET ORAL
Qty: 4 TABLET | Refills: 3 | Status: SHIPPED | OUTPATIENT
Start: 2022-04-01 | End: 2022-06-01 | Stop reason: ALTCHOICE

## 2022-04-01 RX ORDER — IPRATROPIUM BROMIDE AND ALBUTEROL SULFATE 2.5; .5 MG/3ML; MG/3ML
SOLUTION RESPIRATORY (INHALATION)
Qty: 360 ML | Refills: 5 | Status: SHIPPED | OUTPATIENT
Start: 2022-04-01

## 2022-04-01 RX ORDER — ISOSORBIDE DINITRATE 30 MG/1
TABLET ORAL
Qty: 90 TABLET | Refills: 3 | Status: ON HOLD | OUTPATIENT
Start: 2022-04-01 | End: 2022-07-01 | Stop reason: HOSPADM

## 2022-04-01 NOTE — TELEPHONE ENCOUNTER
Requested Prescriptions     Pending Prescriptions Disp Refills    alendronate (FOSAMAX) 70 MG tablet 4 tablet 3     Sig: TAKE ONE TABLET BY MOUTH ONCE A WEEK       lov 11/22/2021  No f/u  Labs 3/3/22

## 2022-04-01 NOTE — TELEPHONE ENCOUNTER
Requested Prescriptions     Pending Prescriptions Disp Refills    ipratropium-albuterol (DUONEB) 0.5-2.5 (3) MG/3ML SOLN nebulizer solution 360 mL 5     Sig: inhale one vial (3ml) via nebulizer every 6 hours         lov 11/22/2021  No f/u  Labs 3/3/22

## 2022-04-01 NOTE — TELEPHONE ENCOUNTER
From: Camila Chase  To: Dr. Fabio Sullivan  Sent: 4/1/2022 2:27 PM EDT  Subject: prescription for spironolactone 25 mg    Please send a request to [id number] on [address] ASAP Thank you Javy Gibson

## 2022-04-04 RX ORDER — SPIRONOLACTONE 25 MG/1
25 TABLET ORAL DAILY
Qty: 90 TABLET | Refills: 0 | Status: SHIPPED | OUTPATIENT
Start: 2022-04-04 | End: 2022-04-05 | Stop reason: SDUPTHER

## 2022-04-04 NOTE — TELEPHONE ENCOUNTER
From: Camila Ramachandran  To: Dr. Irving Ban2022 7:39 PM EDT  Subject: prescription for spironolactone 25 mg    please send a request to [place] [id number] [facility] for spironolactone 25 mg ASAP as I am out of this medication Thank you Carissa Saba

## 2022-04-04 NOTE — TELEPHONE ENCOUNTER
Ok to fill for 90 days per DEB. Pt was to have follow up appt in March 2022. Please schedule pt at [facility] per DEB.

## 2022-04-05 RX ORDER — SPIRONOLACTONE 25 MG/1
25 TABLET ORAL DAILY
Qty: 90 TABLET | Refills: 0 | Status: SHIPPED | OUTPATIENT
Start: 2022-04-05

## 2022-04-05 RX ORDER — SPIRONOLACTONE 25 MG/1
25 TABLET ORAL DAILY
Qty: 90 TABLET | Refills: 0 | Status: CANCELLED | OUTPATIENT
Start: 2022-04-05

## 2022-04-11 ENCOUNTER — OFFICE VISIT (OUTPATIENT)
Dept: PULMONOLOGY | Age: 77
End: 2022-04-11
Payer: MEDICARE

## 2022-04-11 VITALS
WEIGHT: 111 LBS | BODY MASS INDEX: 18.95 KG/M2 | DIASTOLIC BLOOD PRESSURE: 64 MMHG | TEMPERATURE: 96.5 F | HEART RATE: 83 BPM | RESPIRATION RATE: 16 BRPM | HEIGHT: 64 IN | SYSTOLIC BLOOD PRESSURE: 103 MMHG

## 2022-04-11 DIAGNOSIS — J96.11 CHRONIC RESPIRATORY FAILURE WITH HYPOXIA (HCC): ICD-10-CM

## 2022-04-11 DIAGNOSIS — Z72.0 TOBACCO ABUSE DISORDER: Primary | ICD-10-CM

## 2022-04-11 DIAGNOSIS — J43.2 CENTRILOBULAR EMPHYSEMA (HCC): ICD-10-CM

## 2022-04-11 PROCEDURE — 99213 OFFICE O/P EST LOW 20 MIN: CPT | Performed by: INTERNAL MEDICINE

## 2022-04-11 NOTE — PROGRESS NOTES
[facility] Pulmonary and Critical Care    Outpatient follow up Note    Subjective:   Referring Physician: Marilynn Burkitt / HPI:     The patient is 78 y.o. female who presents today for a follow up visit for COPD. Doing better on the trelegy and still using nebulizer BID/prn. On 2L and uses a wheelchair to get to appointments. Hasn't started rehab. Initial history:  Patient recently moved from [facility] to [place] to be closer to her son after her  passed away. Her son is a pediatrician here in the area. Patient has a long history of smoking and history of COPD. [facility] and was on Breo and duo nebs as an outpatient. She reports that she gets dyspneic with exertion and she is on 2 L of oxygen continuously.        Past Medical History:    Past Medical History:   Diagnosis Date    Age-related osteoporosis without current pathological fracture 2021    Other emphysema ([address] 2021    Primary hypertension 2021    Pure hypercholesterolemia 2021    PVD (peripheral vascular disease) [address] [address] 2021       Social History:    Social History     Tobacco Use   Smoking Status Former Smoker    Packs/day: 1.00    Years: 40.00    Pack years: 40.00    Start date:     Quit date:     Years since quittin.2   Smokeless Tobacco Never Used       Family History:  Family History   Problem Relation Age of Onset    Heart Attack Mother 46    Heart Failure Mother     Heart Failure Father 80    Coronary Art Dis Brother      Current Medications:  Current Outpatient Medications on File Prior to Visit   Medication Sig Dispense Refill    spironolactone (ALDACTONE) 25 MG tablet Take 1 tablet by mouth daily 90 tablet 0    ipratropium-albuterol (DUONEB) 0.5-2.5 (3) MG/3ML SOLN nebulizer solution inhale one vial (3ml) via nebulizer every 6 hours 360 mL 5    isosorbide dinitrate (ISORDIL) 30 MG tablet TAKE ONE TABLET BY MOUTH DAILY 90 tablet 3    alendronate (FOSAMAX) 70 MG tablet TAKE ONE TABLET BY MOUTH ONCE A WEEK 4 tablet 3    albuterol sulfate (PROAIR RESPICLICK) 323 (90 Base) MCG/ACT aerosol powder inhalation Inhale 2 puffs into the lungs every 4 hours as needed for Wheezing or Shortness of Breath 1 each 2    Fluticasone-Umeclidin-Vilant (TRELEGY ELLIPTA) 200-62.5-25 MCG/INH AEPB Inhale 1 puff into the lungs daily 3 each 3    nitroGLYCERIN (NITROSTAT) 0.4 MG SL tablet Place 0.4 mg under the tongue every 5 minutes as needed for Chest pain up to max of 3 total doses. If no relief after 1 dose, call 911.  aspirin 81 MG EC tablet Take by mouth      lisinopril (PRINIVIL;ZESTRIL) 5 MG tablet TAKE ONE TABLET BY MOUTH DAILY      metoprolol tartrate (LOPRESSOR) 25 MG tablet TAKE ONE-HALF TABLET BY MOUTH TWICE DAILY      OXYGEN Oxygen Quantity: 0 Refills: 0 Ordered: 23-Jan-2019 DO Active      simvastatin (ZOCOR) 40 MG tablet TAKE ONE TABLET BY MOUTH AT BEDTIME      fluticasone-vilanterol (BREO ELLIPTA) 100-25 MCG/INH AEPB inhaler Inhale into the lungs      magnesium oxide (MAG-OX) 400 MG tablet Take 400 mg by mouth daily      Lift Chair MISC by Does not apply route 1 each 0    Misc. Devices [facility]) MISC 1 each by Does not apply route daily 1 each 0    furosemide (LASIX) 20 MG tablet Take 1-2 tablets by mouth daily (Patient taking differently: Take 20 mg by mouth See Admin Instructions Patient taking 1 tab daily, then 2 tabs daily, every other day) 180 tablet 0     No current facility-administered medications on file prior to visit.        Allergies:  No Known Allergies    REVIEW OF SYSTEMS:    CONSTITUTIONAL: Negative for fevers and chills  HEENT: Negative for oropharyngeal exudate, post nasal drip, sinus pain / pressure, nasal congestion, ear pain  RESPIRATORY:  See HPI  CARDIOVASCULAR: Negative for chest pain, palpitations, edema  GASTROINTESTINAL: Negative for nausea, vomiting, diarrhea, constipation and abdominal pain  HEMATOLOGICAL: Negative for adenopathy  SKIN: Negative for clubbing, cyanosis, skin lesions  EXTREMITIES: Negative for weakness, decreased ROM  NEUROLOGICAL: Negative for unilateral weakness, speech or gait abnormalities  PSYCH: Negative for anxiety, depression    Objective:   PHYSICAL EXAM:        VITALS:  /64 (Site: Left Upper Arm, Position: Sitting, Cuff Size: Medium Adult)   Pulse 83   Temp 96.5 °F (35.8 °C) (Infrared)   Resp 16   Ht 5' 4\" (1.626 m)   Wt 111 lb (50.3 kg)   Breastfeeding No   BMI 19.05 kg/m²     CONSTITUTIONAL:  Awake, alert, cooperative, no apparent distress, and appears stated age  HEENT: No oropharyngeal exudate, PERRL, no cervical adenopathy, no tracheal deviation, thyroid size normal  LUNGS:  No increased work of breathing and clear to auscultation, no crackles or wheezing   CARDIOVASCULAR:  normal S1 and S2 and no JVD  ABDOMEN:  Normal bowel sounds, non-distended and non-tender to palpation  EXT: No edema, no calf tenderness. Pulses are present bilaterally. NEUROLOGIC:  Mental Status Exam:  Level of Alertness:   awake  Orientation:   person, place, time. SKIN:  normal skin color, texture, turgor, no redness, warmth, or swelling     DATA:      Radiology Review:  Pertinent images / reports were reviewed as a part of this visit. Chest xray reveals the following:  emphysema    Last PFTs:  2021:IMPRESSION:  1. Severe obstructive defect is present. 2.  There is no significant response to bronchodilators. 3.  Air trapping and hyperinflation are present. 4.  There is a severe decrease in diffusion capacity. 5. PFTs are consistent with very severe COPD. In the right clinical  situation, patient could be a candidate for endobronchial valves.     Immunizations:   Immunization History   Administered Date(s) Administered    COVID-19, Pfizer Purple top, DILUTE for use, 12+ yrs, 30mcg/0.3mL dose 02/03/2021, 03/03/2021, 03/26/2021, 10/03/2021    Influenza Virus Vaccine 10/20/2021    Pneumococcal Vaccine 10/20/2021     ECHO:  Summary   Normal left ventricle size, wall thickness, and systolic function with an   estimated ejection fraction of >60%. No regional wall motion abnormalities are seen. Indeterminate diastolic function. There is an atrial septal aneurysm seen. Trivial mitral regurgitation. Mild aortic regurgitation. Moderate tricuspid regurgitation. Trivial pulmonic regurgitation present. Estimated pulmonary artery systolic pressure is at 69 mmHg assuming a right   atrial pressure of 3 mmHg. A bubble study was performed and shows evidence of right to left shunting   consistent with a patent foramen ovale or atrial septal defect. Assessment: This is a 78 y.o. female with severe to very severe COPD with emphysema and chronic hypoxemic respiratory failure    Plan:   -COPD seems reasonably well controlled now on trelegy and duonebs. Patient would do well with pulmonary rehab, they are coordinating schedules. -PFTs meet valve criteria. Patient interested so will get stratx CT in lieu of a screening CT. If she's a candidate based on stratx then will get additional testing prior to next visit. Orders Placed This Encounter   Medications    Nebulizers (COMPRESSOR/NEBULIZER) MISC     Si Device by Does not apply route daily     Dispense:  1 each     Refill:  0         Diagnosis Orders   1. Tobacco abuse disorder  CT CHEST HIGH RESOLUTION   2. Centrilobular emphysema ([address]     3. Chronic respiratory failure with hypoxia (HCC)        - Tobacco use: The patient is not currently smoking. More than half the time of this 35 minute visit was spent counseling the patient.    - RTC 3 months w/ MD. Call or RTC sooner if symptoms persist or worsen acutely.

## 2022-04-12 NOTE — TELEPHONE ENCOUNTER
LMOM for pt daughter, Sayra Goins. Trying to clear up the 5/4/22 appointment in  with DEB. Pt normally sees DEB in [facility].     Asking if the pt wants to be seen for this visit in  or [facility]

## 2022-04-12 NOTE — TELEPHONE ENCOUNTER
04/12-Per pts appt desk the pt has been scheduled 05/04 w/DEB @ [facility]

## 2022-04-20 ENCOUNTER — HOSPITAL ENCOUNTER (OUTPATIENT)
Dept: CT IMAGING | Age: 77
Discharge: HOME | End: 2022-04-20
Payer: MEDICARE

## 2022-04-20 DIAGNOSIS — Z72.0 TOBACCO ABUSE DISORDER: ICD-10-CM

## 2022-04-20 PROCEDURE — 71250 CT THORAX DX C-: CPT

## 2022-04-21 ENCOUNTER — TELEPHONE (OUTPATIENT)
Dept: PULMONOLOGY | Age: 77
End: 2022-04-21

## 2022-04-21 DIAGNOSIS — J43.2 CENTRILOBULAR EMPHYSEMA (HCC): Primary | ICD-10-CM

## 2022-04-22 NOTE — TELEPHONE ENCOUNTER
[place] [facility] [place] if [place] is a candidate for Albers valves

## 2022-04-28 ENCOUNTER — HOSPITAL ENCOUNTER (OUTPATIENT)
Dept: CARDIAC REHAB | Age: 77
Setting detail: THERAPIES SERIES
Discharge: HOME | End: 2022-04-28
Payer: MEDICARE

## 2022-04-28 PROCEDURE — 94625 PHY/QHP OP PULM RHB W/O MNTR: CPT

## 2022-04-28 NOTE — TELEPHONE ENCOUNTER
Her stratX CT indicates she may have a target in her left upper lobe. I did order additional testing to risk stratify and ensure that the left upper lobe is a safe target, as it's the only option. Diagnosis Orders   1.  Centrilobular emphysema (HCC)  NM LUNG PERFUSION W QUANTITATIVE DIFF FUNCTION    6 Minute Walk Test    Blood Gas, Arterial

## 2022-05-02 ENCOUNTER — HOSPITAL ENCOUNTER (OUTPATIENT)
Dept: CARDIAC REHAB | Age: 77
Setting detail: THERAPIES SERIES
Discharge: HOME | End: 2022-05-02
Payer: MEDICARE

## 2022-05-02 PROCEDURE — G0239 OTH RESP PROC, GROUP: HCPCS

## 2022-05-02 NOTE — PROGRESS NOTES
[address]  Advanced CHF/Pulmonary Hypertension   Cardiac Evaluation      Yeimi Hartman  YOB: 1945    Date of visit: 5/4/22    Chief Complaint   Patient presents with    Follow-up    Hypertension      History of Present Illness:  Yeimi Hartman is a 78 y.o. female who presents from referral from Yaritza Mendez MD for consultation and management of acute on chronic congestive heart failure. Patient has a past medical history of PAT, PAD,  HTN and COPD w/ emphysema (on home O2). Of note, patient wore a cardiac event monitor in 7/2019 that showed predominately ST. Echo 12/2021 demonstrated an LVEF of 60%,  indeterminate diastolic function. There is an atrial septal aneurysm seen. Trivial mitral regurgitation. Mild aortic regurgitation. Moderate tricuspid regurgitation. Trivial pulmonic regurgitation present. Estimated pulmonary artery systolic pressure is at 69 mmHg assuming a right   atrial pressure of 3 mmHg. A bubble study was performed and shows evidence of right to left shunting consistent with a patent foramen ovale or atrial septal defect. Today, she is here for regular follow up; she arrived in a wheelchair with oxygen in place. She has occasional light-headedness, but brief and no syncope; it does not concern her. She remains EISENBERG, no changes, not worsening. Pulmonologist Dr. Mclaughlin Child considering [place] procedure to aid breathing, also would like her to being pulm rehab. She denies exertional chest pain, PND, palpitations, light-headedness, edema. Her son is with her for the visit.     No Known Allergies  Current Outpatient Medications   Medication Sig Dispense Refill    Nebulizers (COMPRESSOR/NEBULIZER) MISC 1 Device by Does not apply route daily 1 each 0    spironolactone (ALDACTONE) 25 MG tablet Take 1 tablet by mouth daily 90 tablet 0    ipratropium-albuterol (DUONEB) 0.5-2.5 (3) MG/3ML SOLN nebulizer solution inhale one vial (3ml) via nebulizer every 6 hours 360 mL 5    isosorbide dinitrate (ISORDIL) 30 MG tablet TAKE ONE TABLET BY MOUTH DAILY 90 tablet 3    alendronate (FOSAMAX) 70 MG tablet TAKE ONE TABLET BY MOUTH ONCE A WEEK 4 tablet 3    albuterol sulfate (PROAIR RESPICLICK) 977 (90 Base) MCG/ACT aerosol powder inhalation Inhale 2 puffs into the lungs every 4 hours as needed for Wheezing or Shortness of Breath 1 each 2    Fluticasone-Umeclidin-Vilant (TRELEGY ELLIPTA) 200-62.5-25 MCG/INH AEPB Inhale 1 puff into the lungs daily 3 each 3    nitroGLYCERIN (NITROSTAT) 0.4 MG SL tablet Place 0.4 mg under the tongue every 5 minutes as needed for Chest pain up to max of 3 total doses. If no relief after 1 dose, call 911.  aspirin 81 MG EC tablet Take by mouth      lisinopril (PRINIVIL;ZESTRIL) 5 MG tablet TAKE ONE TABLET BY MOUTH DAILY      metoprolol tartrate (LOPRESSOR) 25 MG tablet TAKE ONE-HALF TABLET BY MOUTH TWICE DAILY      OXYGEN 3 L       simvastatin (ZOCOR) 40 MG tablet TAKE ONE TABLET BY MOUTH AT BEDTIME      fluticasone-vilanterol (BREO ELLIPTA) 100-25 MCG/INH AEPB inhaler Inhale into the lungs      magnesium oxide (MAG-OX) 400 MG tablet Take 400 mg by mouth daily      Lift Chair MISC by Does not apply route 1 each 0    Misc. Devices [facility]) MISC 1 each by Does not apply route daily 1 each 0    furosemide (LASIX) 20 MG tablet Take 1-2 tablets by mouth daily (Patient taking differently: Take 20 mg by mouth See Admin Instructions Patient taking 1 tab daily, then 2 tabs daily, every other day) 180 tablet 0     No current facility-administered medications for this visit.        Past Medical History:   Diagnosis Date    Age-related osteoporosis without current pathological fracture 11/22/2021    Other emphysema ([address] 11/22/2021    Primary hypertension 11/22/2021    Pure hypercholesterolemia 11/22/2021    PVD (peripheral vascular disease) ([facility] [address] 11/22/2021     Past Surgical History:   Procedure Laterality Date    ANKLE FRACTURE SURGERY Left     HIP FRACTURE SURGERY Left     PERIPHERAL PERCUTANEOUS ARTERIAL INTERVENTION Right 2010    lower     Family History   Problem Relation Age of Onset    Heart Attack Mother 46    Heart Failure Mother     Heart Failure Father 80    Coronary Art Dis Brother      Social History     Socioeconomic History    Marital status:      Spouse name: Not on file    Number of children: Not on file    Years of education: Not on file    Highest education level: Not on file   Occupational History    Not on file   Tobacco Use    Smoking status: Former Smoker     Packs/day: 1.00     Years: 40.00     Pack years: 40.00     Start date:      Quit date:      Years since quittin.3    Smokeless tobacco: Never Used   Vaping Use    Vaping Use: Never used   Substance and Sexual Activity    Alcohol use: Yes     Alcohol/week: 7.0 standard drinks     Types: 7 Standard drinks or equivalent per week     Comment: one daily    Drug use: Never    Sexual activity: Not on file   Other Topics Concern    Not on file   Social History Narrative    Not on file     Social Determinants of Health     Financial Resource Strain:     Difficulty of Paying Living Expenses: Not on file   Food Insecurity:     Worried About Running Out of Food in the Last Year: Not on file    Ale of Food in the Last Year: Not on file   Transportation Needs:     Lack of Transportation (Medical): Not on file    Lack of Transportation (Non-Medical):  Not on file   Physical Activity:     Days of Exercise per Week: Not on file    Minutes of Exercise per Session: Not on file   Stress:     Feeling of Stress : Not on file   Social Connections:     Frequency of Communication with Friends and Family: Not on file    Frequency of Social Gatherings with Friends and Family: Not on file    Attends Orthodoxy Services: Not on file    Active Member of Clubs or Organizations: Not on file    Attends Club or Organization Meetings: Not on file   Elizabeth Marital Status: Not on file   Intimate Partner Violence:     Fear of Current or Ex-Partner: Not on file    Emotionally Abused: Not on file    Physically Abused: Not on file    Sexually Abused: Not on file   Housing Stability:     Unable to Pay for Housing in the Last Year: Not on file    Number of Lynette in the Last Year: Not on file    Unstable Housing in the Last Year: Not on file       Review of Systems:   · Constitutional: there has been no unanticipated weight loss. There's been no change in energy level, sleep pattern, or activity level. · Eyes: No visual changes or diplopia. No scleral icterus. · ENT: No Headaches, hearing loss or vertigo. No mouth sores or sore throat. · Cardiovascular: Reviewed in HPI  · Respiratory: No cough or wheezing, no sputum production. No hematemesis. · Gastrointestinal: No abdominal pain, appetite loss, blood in stools. No change in bowel or bladder habits. · Genitourinary: No dysuria, trouble voiding, or hematuria. · Musculoskeletal:  No gait disturbance, weakness or joint complaints. · Integumentary: No rash or pruritis. · Neurological: No headache, diplopia, change in muscle strength, numbness or tingling. No change in gait, balance, coordination, mood, affect, memory, mentation, behavior. · Psychiatric: No anxiety, no depression. · Endocrine: No malaise, fatigue or temperature intolerance. No excessive thirst, fluid intake, or urination. No tremor. · Hematologic/Lymphatic: No abnormal bruising or bleeding, blood clots or swollen lymph nodes. · Allergic/Immunologic: No nasal congestion or hives. Physical Examination:    Vitals:    05/04/22 0902   BP: 100/60   Site: Left Upper Arm   Position: Sitting   Cuff Size: Medium Adult   Pulse: 53   SpO2: (!) 79%   Weight: 110 lb (49.9 kg)   Height: 5' (1.524 m)     Body mass index is 21.48 kg/m².      Wt Readings from Last 3 Encounters:   05/04/22 110 lb (49.9 kg)   04/11/22 111 lb (50.3 kg)   01/20/22 111 lb (50.3 kg)     BP Readings from Last 3 Encounters:   05/04/22 100/60   04/11/22 103/64   01/20/22 95/67          Constitutional and General Appearance:   WD/WN in NAD  HEENT:  NC/AT  BIANKA  No problems with hearing  Skin:  Warm, dry  Respiratory:  · Normal excursion and expansion without use of accessory muscles  · Resp Auscultation: Normal breath sounds without dullness  Cardiovascular:  · The apical impulses not displaced  · Heart tones are crisp and normal  · Cervical veins are not engorged  · The carotid upstroke is normal in amplitude and contour without delay or bruit  · JVP less than 8 cm H2O  RRR with nl S1 and S2 without m,r,g  · Peripheral pulses are symmetrical and full  · There is no clubbing, cyanosis of the extremities. · No edema  · Femoral Arteries: 2+ and equal  · Pedal Pulses: 2+ and equal   Neck:  · No thyromegaly  Abdomen:  · No masses or tenderness  · Liver/Spleen: No Abnormalities Noted  Neurological/Psychiatric:  · Alert and oriented in all spheres  · Moves all extremities well  · Exhibits normal gait balance and coordination  · No abnormalities of mood, affect, memory, mentation, or behavior are noted    ECHO 12/8/21  Normal left ventricle size, wall thickness, and systolic function with an estimated ejection fraction of >60%. No regional wall motion abnormalities are seen. Indeterminate diastolic function. There is an atrial septal aneurysm seen. Trivial mitral regurgitation. Mild aortic regurgitation. Moderate tricuspid regurgitation. Trivial pulmonic regurgitation present. Estimated pulmonary artery systolic pressure is at 69 mmHg assuming a right   atrial pressure of 3 mmHg. A bubble study was performed and shows evidence of right to left shunting   consistent with a patent foramen ovale or atrial septal defect. Labs were reviewed including labs from other hospital systems [facility].   Cardiac testing was reviewed including echos, nuclear scans, cardiac catheterization, including from other hospital systems through [facility]. Assessment:      1. Chronic diastolic heart failure ([facility] [address]    2. EISENBERG (dyspnea on exertion)    3. Screening for deficiency anemia    4. Pulmonary emphysema, unspecified emphysema type ([facility] [address]    5. Coronary artery disease involving native heart without angina pectoris, unspecified vessel or lesion type    6. Pulmonary hypertension (HCC)      Chronic diastolic heart failure  Ongoing EISENBERG that is not worsening  Using oxygen  ECHO 12/8/21>  Indeterminate diastolic function, EF >77%  Repeat ECHO after a few weeks of (pulmonary) rehab    Coronary artery disease  Stable, no anginal symptoms    Essential hypertension, benign  Low, occ dizziness, no syncope  Blood pressure 100/60, pulse 53, height 5' (1.524 m), weight 110 lb (49.9 kg), SpO2 (!) 79%    Paroxysmal atrial tachycardia, h/o  HR 53 today    Pulmonary emphysema / COPD  F/w Dr. Michel Chiang  Uses oxygen 24/7, on it since 2011  Soon to begin pulm rehab      PLAN:  1. No med changes. Consider sildenafil  2. Labs soon> CMP, BNP, CBC, Fe/TIBC, ferritin  3. OV with ECHO same day 4-6 weeks (want a few weeks of rehab first)    Time Based Itemization  A total of 40 minutes was spent on today's patient encounter. If applicable, non-patient-facing activities:  ( x)Preparing to see the patient and reviewing records  ( ) Individual interpretation of results  ( ) Discussion or coordination of care with other health care professionals  ( x) Ordering of unique tests, medications, or procedures  ( x) Documentation within the EHR      I appreciate the opportunity of cooperating in the care of this patient. Mackenzie Ramirez M.D., [address] attestation: This note was scribed in the presence of Dr. Shelia Butcher MD, by Dalton Briseno RN. The scribe's documentation has been prepared under my direction and personally reviewed by me in its entirety.   I confirm that the note above accurately reflects all work, treatment, procedures, and medical decision making performed by me.

## 2022-05-04 ENCOUNTER — HOSPITAL ENCOUNTER (OUTPATIENT)
Dept: CARDIAC REHAB | Age: 77
Setting detail: THERAPIES SERIES
Discharge: HOME | End: 2022-05-04
Payer: MEDICARE

## 2022-05-04 ENCOUNTER — OFFICE VISIT (OUTPATIENT)
Dept: CARDIOLOGY CLINIC | Age: 77
End: 2022-05-04
Payer: MEDICARE

## 2022-05-04 VITALS
OXYGEN SATURATION: 79 % | HEART RATE: 53 BPM | BODY MASS INDEX: 21.6 KG/M2 | HEIGHT: 60 IN | SYSTOLIC BLOOD PRESSURE: 100 MMHG | WEIGHT: 110 LBS | DIASTOLIC BLOOD PRESSURE: 60 MMHG

## 2022-05-04 DIAGNOSIS — I50.32 CHRONIC DIASTOLIC HEART FAILURE (HCC): Primary | ICD-10-CM

## 2022-05-04 DIAGNOSIS — Z13.0 SCREENING FOR DEFICIENCY ANEMIA: ICD-10-CM

## 2022-05-04 DIAGNOSIS — I27.20 PULMONARY HYPERTENSION (HCC): ICD-10-CM

## 2022-05-04 DIAGNOSIS — J43.9 PULMONARY EMPHYSEMA, UNSPECIFIED EMPHYSEMA TYPE (HCC): ICD-10-CM

## 2022-05-04 DIAGNOSIS — I25.10 CORONARY ARTERY DISEASE INVOLVING NATIVE HEART WITHOUT ANGINA PECTORIS, UNSPECIFIED VESSEL OR LESION TYPE: ICD-10-CM

## 2022-05-04 DIAGNOSIS — R06.09 DOE (DYSPNEA ON EXERTION): ICD-10-CM

## 2022-05-04 PROCEDURE — G0239 OTH RESP PROC, GROUP: HCPCS

## 2022-05-04 PROCEDURE — 99215 OFFICE O/P EST HI 40 MIN: CPT | Performed by: INTERNAL MEDICINE

## 2022-05-05 ENCOUNTER — TELEPHONE (OUTPATIENT)
Dept: PULMONOLOGY | Age: 77
End: 2022-05-05

## 2022-05-05 DIAGNOSIS — J43.2 CENTRILOBULAR EMPHYSEMA (HCC): Primary | ICD-10-CM

## 2022-05-05 NOTE — TELEPHONE ENCOUNTER
Per [employer], PA & lat CXR is needed within 12 hrs prior to NM lung perfusion study, which is scheduled for 05/17/2022. Please sign pended order.

## 2022-05-06 ENCOUNTER — HOSPITAL ENCOUNTER (OUTPATIENT)
Dept: CARDIAC REHAB | Age: 77
Setting detail: THERAPIES SERIES
Discharge: HOME | End: 2022-05-06
Payer: MEDICARE

## 2022-05-06 PROCEDURE — G0239 OTH RESP PROC, GROUP: HCPCS

## 2022-05-09 ENCOUNTER — HOSPITAL ENCOUNTER (OUTPATIENT)
Dept: CARDIAC REHAB | Age: 77
Setting detail: THERAPIES SERIES
Discharge: HOME | End: 2022-05-09
Payer: MEDICARE

## 2022-05-09 PROCEDURE — G0239 OTH RESP PROC, GROUP: HCPCS

## 2022-05-11 ENCOUNTER — HOSPITAL ENCOUNTER (OUTPATIENT)
Dept: CARDIAC REHAB | Age: 77
Setting detail: THERAPIES SERIES
Discharge: HOME | End: 2022-05-11
Payer: MEDICARE

## 2022-05-11 DIAGNOSIS — R06.09 DOE (DYSPNEA ON EXERTION): ICD-10-CM

## 2022-05-11 DIAGNOSIS — Z13.0 SCREENING FOR DEFICIENCY ANEMIA: ICD-10-CM

## 2022-05-11 DIAGNOSIS — I50.32 CHRONIC DIASTOLIC HEART FAILURE (HCC): ICD-10-CM

## 2022-05-11 LAB
A/G RATIO: 1.6 (ref 1.1–2.2)
ALBUMIN SERPL-MCNC: 4.2 G/DL (ref 3.4–5)
ALP BLD-CCNC: 62 U/L (ref 40–129)
ALT SERPL-CCNC: 15 U/L (ref 10–40)
ANION GAP SERPL CALCULATED.3IONS-SCNC: 12 MMOL/L (ref 3–16)
AST SERPL-CCNC: 23 U/L (ref 15–37)
BASOPHILS ABSOLUTE: 0 K/UL (ref 0–0.2)
BASOPHILS RELATIVE PERCENT: 0.7 %
BILIRUB SERPL-MCNC: 0.4 MG/DL (ref 0–1)
BUN BLDV-MCNC: 20 MG/DL (ref 7–20)
CALCIUM SERPL-MCNC: 9.5 MG/DL (ref 8.3–10.6)
CHLORIDE BLD-SCNC: 97 MMOL/L (ref 99–110)
CO2: 30 MMOL/L (ref 21–32)
CREAT SERPL-MCNC: 0.8 MG/DL (ref 0.6–1.2)
EOSINOPHILS ABSOLUTE: 0.1 K/UL (ref 0–0.6)
EOSINOPHILS RELATIVE PERCENT: 1.9 %
FERRITIN: 318.6 NG/ML (ref 15–150)
GFR AFRICAN AMERICAN: >60
GFR NON-AFRICAN AMERICAN: >60
GLUCOSE BLD-MCNC: 97 MG/DL (ref 70–99)
HCT VFR BLD CALC: 34 % (ref 36–48)
HEMOGLOBIN: 11.4 G/DL (ref 12–16)
IRON SATURATION: 14 % (ref 15–50)
IRON: 42 UG/DL (ref 37–145)
LYMPHOCYTES ABSOLUTE: 1.1 K/UL (ref 1–5.1)
LYMPHOCYTES RELATIVE PERCENT: 15 %
MCH RBC QN AUTO: 31.6 PG (ref 26–34)
MCHC RBC AUTO-ENTMCNC: 33.5 G/DL (ref 31–36)
MCV RBC AUTO: 94.4 FL (ref 80–100)
MONOCYTES ABSOLUTE: 0.8 K/UL (ref 0–1.3)
MONOCYTES RELATIVE PERCENT: 10.6 %
NEUTROPHILS ABSOLUTE: 5.2 K/UL (ref 1.7–7.7)
NEUTROPHILS RELATIVE PERCENT: 71.8 %
PDW BLD-RTO: 13.5 % (ref 12.4–15.4)
PLATELET # BLD: 199 K/UL (ref 135–450)
PMV BLD AUTO: 9.4 FL (ref 5–10.5)
POTASSIUM SERPL-SCNC: 5.1 MMOL/L (ref 3.5–5.1)
PRO-BNP: 598 PG/ML (ref 0–449)
RBC # BLD: 3.61 M/UL (ref 4–5.2)
SODIUM BLD-SCNC: 139 MMOL/L (ref 136–145)
TOTAL IRON BINDING CAPACITY: 301 UG/DL (ref 260–445)
TOTAL PROTEIN: 6.9 G/DL (ref 6.4–8.2)
WBC # BLD: 7.2 K/UL (ref 4–11)

## 2022-05-11 PROCEDURE — G0239 OTH RESP PROC, GROUP: HCPCS

## 2022-05-16 ENCOUNTER — HOSPITAL ENCOUNTER (OUTPATIENT)
Dept: CARDIAC REHAB | Age: 77
Setting detail: THERAPIES SERIES
Discharge: HOME | End: 2022-05-16
Payer: MEDICARE

## 2022-05-16 PROCEDURE — G0239 OTH RESP PROC, GROUP: HCPCS

## 2022-05-17 ENCOUNTER — APPOINTMENT (OUTPATIENT)
Dept: NUCLEAR MEDICINE | Age: 77
End: 2022-05-17
Payer: MEDICARE

## 2022-05-17 ENCOUNTER — APPOINTMENT (OUTPATIENT)
Dept: PULMONOLOGY | Age: 77
End: 2022-05-17
Payer: MEDICARE

## 2022-05-18 ENCOUNTER — HOSPITAL ENCOUNTER (OUTPATIENT)
Dept: CARDIAC REHAB | Age: 77
Setting detail: THERAPIES SERIES
Discharge: HOME | End: 2022-05-18
Payer: MEDICARE

## 2022-05-18 PROCEDURE — G0239 OTH RESP PROC, GROUP: HCPCS

## 2022-05-20 ENCOUNTER — HOSPITAL ENCOUNTER (OUTPATIENT)
Dept: CARDIAC REHAB | Age: 77
Setting detail: THERAPIES SERIES
Discharge: HOME | End: 2022-05-20
Payer: MEDICARE

## 2022-05-20 PROCEDURE — G0239 OTH RESP PROC, GROUP: HCPCS

## 2022-05-23 ENCOUNTER — HOSPITAL ENCOUNTER (OUTPATIENT)
Dept: CARDIAC REHAB | Age: 77
Setting detail: THERAPIES SERIES
Discharge: HOME | End: 2022-05-23
Payer: MEDICARE

## 2022-05-23 PROCEDURE — G0239 OTH RESP PROC, GROUP: HCPCS

## 2022-05-25 ENCOUNTER — HOSPITAL ENCOUNTER (OUTPATIENT)
Dept: CARDIAC REHAB | Age: 77
Setting detail: THERAPIES SERIES
Discharge: HOME | End: 2022-05-25
Payer: MEDICARE

## 2022-05-25 PROCEDURE — G0239 OTH RESP PROC, GROUP: HCPCS

## 2022-05-25 SDOH — HEALTH STABILITY: PHYSICAL HEALTH: ON AVERAGE, HOW MANY DAYS PER WEEK DO YOU ENGAGE IN MODERATE TO STRENUOUS EXERCISE (LIKE A BRISK WALK)?: 3 DAYS

## 2022-05-25 SDOH — HEALTH STABILITY: PHYSICAL HEALTH: ON AVERAGE, HOW MANY MINUTES DO YOU ENGAGE IN EXERCISE AT THIS LEVEL?: 40 MIN

## 2022-05-25 ASSESSMENT — PATIENT HEALTH QUESTIONNAIRE - PHQ9
1. LITTLE INTEREST OR PLEASURE IN DOING THINGS: 0
SUM OF ALL RESPONSES TO PHQ QUESTIONS 1-9: 0
SUM OF ALL RESPONSES TO PHQ QUESTIONS 1-9: 0
2. FEELING DOWN, DEPRESSED OR HOPELESS: 0
SUM OF ALL RESPONSES TO PHQ QUESTIONS 1-9: 0
SUM OF ALL RESPONSES TO PHQ9 QUESTIONS 1 & 2: 0
SUM OF ALL RESPONSES TO PHQ QUESTIONS 1-9: 0

## 2022-05-25 ASSESSMENT — LIFESTYLE VARIABLES
HOW OFTEN DO YOU HAVE A DRINK CONTAINING ALCOHOL: 2-4 TIMES A MONTH
HOW OFTEN DO YOU HAVE A DRINK CONTAINING ALCOHOL: 3
HOW OFTEN DO YOU HAVE SIX OR MORE DRINKS ON ONE OCCASION: 1
HOW MANY STANDARD DRINKS CONTAINING ALCOHOL DO YOU HAVE ON A TYPICAL DAY: 1
HOW MANY STANDARD DRINKS CONTAINING ALCOHOL DO YOU HAVE ON A TYPICAL DAY: 1 OR 2

## 2022-06-01 ENCOUNTER — HOSPITAL ENCOUNTER (OUTPATIENT)
Dept: CARDIAC REHAB | Age: 77
Setting detail: THERAPIES SERIES
Discharge: HOME | End: 2022-06-01
Payer: MEDICARE

## 2022-06-01 ENCOUNTER — OFFICE VISIT (OUTPATIENT)
Dept: FAMILY MEDICINE CLINIC | Age: 77
End: 2022-06-01
Payer: MEDICARE

## 2022-06-01 VITALS
DIASTOLIC BLOOD PRESSURE: 58 MMHG | OXYGEN SATURATION: 94 % | WEIGHT: 109 LBS | SYSTOLIC BLOOD PRESSURE: 95 MMHG | BODY MASS INDEX: 21.29 KG/M2

## 2022-06-01 DIAGNOSIS — M81.0 AGE-RELATED OSTEOPOROSIS WITHOUT CURRENT PATHOLOGICAL FRACTURE: ICD-10-CM

## 2022-06-01 DIAGNOSIS — Z00.00 INITIAL MEDICARE ANNUAL WELLNESS VISIT: Primary | ICD-10-CM

## 2022-06-01 PROCEDURE — G0439 PPPS, SUBSEQ VISIT: HCPCS | Performed by: FAMILY MEDICINE

## 2022-06-01 PROCEDURE — 1123F ACP DISCUSS/DSCN MKR DOCD: CPT | Performed by: FAMILY MEDICINE

## 2022-06-01 PROCEDURE — G0239 OTH RESP PROC, GROUP: HCPCS

## 2022-06-01 SDOH — ECONOMIC STABILITY: FOOD INSECURITY: WITHIN THE PAST 12 MONTHS, THE FOOD YOU BOUGHT JUST DIDN'T LAST AND YOU DIDN'T HAVE MONEY TO GET MORE.: NEVER TRUE

## 2022-06-01 SDOH — ECONOMIC STABILITY: FOOD INSECURITY: WITHIN THE PAST 12 MONTHS, YOU WORRIED THAT YOUR FOOD WOULD RUN OUT BEFORE YOU GOT MONEY TO BUY MORE.: NEVER TRUE

## 2022-06-01 ASSESSMENT — SOCIAL DETERMINANTS OF HEALTH (SDOH): HOW HARD IS IT FOR YOU TO PAY FOR THE VERY BASICS LIKE FOOD, HOUSING, MEDICAL CARE, AND HEATING?: NOT HARD AT ALL

## 2022-06-01 NOTE — PATIENT INSTRUCTIONS
Advance Directives: Care Instructions  Overview  An advance directive is a legal way to state your wishes at the end of your life. It tells your family and your doctor what to do if you can't say what youwant. There are two main types of advance directives. You can change them any timeyour wishes change. Living will. This form tells your family and your doctor your wishes about life support and other treatment. The form is also called a declaration. Medical power of . This form lets you name a person to make treatment decisions for you when you can't speak for yourself. This person is called a health care agent (health care proxy, health care surrogate). The form is also called a durable power of  for health care. If you do not have an advance directive, decisions about your medical care maybe made by a family member, or by a doctor or a  who doesn't know you. It may help to think of an advance directive as a gift to the people who carefor you. If you have one, they won't have to make tough decisions by themselves. Follow-up care is a key part of your treatment and safety. Be sure to make and go to all appointments, and call your doctor if you are having problems. It's also a good idea to know your test results and keep alist of the medicines you take. What should you include in an advance directive? Many states have a unique advance directive form. (It may ask you to address specific issues.) Or you might use a universal form that's approved by manystates. If your form doesn't tell you what to address, it may be hard to know what to include in your advance directive. Use the questions below to help you getstarted.  Who do you want to make decisions about your medical care if you are not able to?  What life-support measures do you want if you have a serious illness that gets worse over time or can't be cured?  What are you most afraid of that might happen?  (Maybe you're afraid of having pain, losing your independence, or being kept alive by machines.)   Where would you prefer to die? (Your home? A hospital? A nursing home?)   Do you want to donate your organs when you die?  Do you want certain [employer] practices performed before you die? When should you call for help? Be sure to contact your doctor if you have any questions. Where can you learn more? Go to [email or web address] and sign in to your [employer] account. Enter R264 in the Shared Spectrum box to learn more about \"Advance Directives: Care Instructions. \"     If you do not have an account, please click on the \"Sign Up Now\" link. Current as of: October 18, 2021               Content Version: 13.2  © 7780-3666 Healthwise, [employer]. Care instructions adapted under license by [facility] ([facility]). If you have questions about a medical condition or this instruction, always ask your healthcare professional. [place] [address] any warranty or liability for your use of this information. Personalized Preventive Plan for Tuan Canas - 6/1/2022  Medicare offers a range of preventive health benefits. Some of the tests and screenings are paid in full while other may be subject to a deductible, co-insurance, and/or copay. Some of these benefits include a comprehensive review of your medical history including lifestyle, illnesses that may run in your family, and various assessments and screenings as appropriate. After reviewing your medical record and screening and assessments performed today your provider may have ordered immunizations, labs, imaging, and/or referrals for you. A list of these orders (if applicable) as well as your Preventive Care list are included within your After Visit Summary for your review.     Other Preventive Recommendations:    · A preventive eye exam performed by an eye specialist is recommended every 1-2 years to screen for glaucoma; cataracts, macular degeneration, and other eye disorders. · A preventive dental visit is recommended every 6 months. · Try to get at least 150 minutes of exercise per week or 10,000 steps per day on a pedometer . · Order or download the FREE \"Exercise & Physical Activity: Your Everyday Guide\" from [employer]. Call 2-532.742.1238 or search [employer] online. · You need 4737-1747 mg of calcium and 8379-1464 IU of vitamin D per day. It is possible to meet your calcium requirement with diet alone, but a vitamin D supplement is usually necessary to meet this goal.  · When exposed to the sun, use a sunscreen that protects against both UVA and UVB radiation with an SPF of 30 or greater. Reapply every 2 to 3 hours or after sweating, drying off with a towel, or swimming. · Always wear a seat belt when traveling in a car. Always wear a helmet when riding a bicycle or motorcycle.

## 2022-06-01 NOTE — PROGRESS NOTES
Medicare Annual Wellness Visit    Jorge Rojo is here for [facility]    Assessment & Plan   Initial Medicare annual wellness visit  Requested copy vaccines, dexa scan, colonoscopy from previous physician. Age-related osteoporosis without current pathological fracture  -     DEXA BONE DENSITY 2 SITES; Future      Recommendations for Preventive Services Due: see orders and patient instructions/AVS.  Recommended screening schedule for the next 5-10 years is provided to the patient in written form: see Patient Instructions/AVS.     No follow-ups on file. Subjective   The following acute and/or chronic problems were also addressed today:      Osteoporosis  - on Fosamax x 5 years. Patient's complete Health Risk Assessment and screening values have been reviewed and are found in Flowsheets. The following problems were reviewed today and where indicated follow up appointments were made and/or referrals ordered.     Positive Risk Factor Screenings with Interventions:             General Health and ACP:  General  In general, how would you say your health is?: Fair  In the past 7 days, have you experienced any of the following: New or Increased Pain, New or Increased Fatigue, Loneliness, Social Isolation, Stress or Anger?: No  Do you get the social and emotional support that you need?: Yes  Do you have a Living Will?: (!) No    Advance Directives     Power of [address] [place] [address] Will ACP-Advance Directive ACP-Power of     Not on File Not on File Not on File Not on File      General Health Risk Interventions:  · No Living Will: Advance Care Planning addressed with patient today and copy provided    Health Habits/Nutrition:     Physical Activity: Insufficiently Active    Days of Exercise per Week: 3 days    Minutes of Exercise per Session: 40 min     Have you lost any weight without trying in the past 3 months?: (!) Yes     Have you seen the dentist within the past year?: Yes    Health Habits/Nutrition Interventions:  · wt loss has stabilzied since moved to [place]    Hearing/Vision:  Do you or your family notice any trouble with your hearing that hasn't been managed with hearing aids?: (!) Yes  Do you have difficulty driving, watching TV, or doing any of your daily activities because of your eyesight?: No  Have you had an eye exam within the past year?: (!) No  No exam data present    Hearing/Vision Interventions:  · Hearing concerns:  hearing loss is mild. monitor, audiology referral provided  · Vision concerns:  patient encouraged to make appointment with his/her eye specialist     ADLs:  In the past 7 days, did you need help from others to perform any of the following everyday activities: Eating, dressing, grooming, bathing, toileting, or walking/balance?: No  In the past 7 days, did you need help from others to take care of any of the following: Laundry, housekeeping, banking/finances, shopping, telephone use, food preparation, transportation, or taking medications?: (!) Yes  Select all that apply: Sherman Mendez    ADL Interventions:  · has all the help she needs          Objective   Vitals:    06/01/22 1110   BP: (!) 95/58   SpO2: 94%   Weight: 109 lb (49.4 kg)      Body mass index is 21.29 kg/m².      Wt Readings from Last 3 Encounters:   06/01/22 109 lb (49.4 kg)   05/04/22 110 lb (49.9 kg)   04/11/22 111 lb (50.3 kg)         General Appearance: alert and oriented to person, place and time, well developed and well- nourished, in no acute distress  Skin: warm and dry, no rash or erythema  Head: normocephalic and atraumatic  Eyes: pupils equal, round, and reactive to light, extraocular eye movements intact, conjunctivae normal  ENT: tympanic membrane, external ear and ear canal normal bilaterally, nose without deformity, nasal mucosa and turbinates normal without polyps  Neck: supple and non-tender without mass, no thyromegaly or thyroid nodules, no cervical lymphadenopathy  Pulmonary/Chest: clear to auscultation bilaterally- no wheezes, rales or rhonchi, normal air movement, no respiratory distress  Cardiovascular: normal rate, regular rhythm, normal S1 and S2, no murmurs, rubs, clicks, or gallops, distal pulses intact, no carotid bruits  Abdomen: soft, non-tender, non-distended, normal bowel sounds, no masses or organomegaly  Extremities: no cyanosis, clubbing or edema  Musculoskeletal: normal range of motion, no joint swelling, deformity or tenderness  Neurologic: reflexes normal and symmetric, no cranial nerve deficit, gait, coordination and speech normal       No Known Allergies  Outpatient Medications Marked as Taking for the 6/1/22 encounter (Office Visit) with Christa Anderson MD   Medication Sig Dispense Refill    Nebulizers (COMPRESSOR/NEBULIZER) MISC 1 Device by Does not apply route daily 1 each 0    spironolactone (ALDACTONE) 25 MG tablet Take 1 tablet by mouth daily 90 tablet 0    ipratropium-albuterol (DUONEB) 0.5-2.5 (3) MG/3ML SOLN nebulizer solution inhale one vial (3ml) via nebulizer every 6 hours 360 mL 5    isosorbide dinitrate (ISORDIL) 30 MG tablet TAKE ONE TABLET BY MOUTH DAILY 90 tablet 3    albuterol sulfate (PROAIR RESPICLICK) 678 (90 Base) MCG/ACT aerosol powder inhalation Inhale 2 puffs into the lungs every 4 hours as needed for Wheezing or Shortness of Breath 1 each 2    Fluticasone-Umeclidin-Vilant (TRELEGY ELLIPTA) 200-62.5-25 MCG/INH AEPB Inhale 1 puff into the lungs daily 3 each 3    nitroGLYCERIN (NITROSTAT) 0.4 MG SL tablet Place 0.4 mg under the tongue every 5 minutes as needed for Chest pain up to max of 3 total doses. If no relief after 1 dose, call 911.       aspirin 81 MG EC tablet Take by mouth      lisinopril (PRINIVIL;ZESTRIL) 5 MG tablet TAKE ONE TABLET BY MOUTH DAILY      metoprolol tartrate (LOPRESSOR) 25 MG tablet TAKE ONE-HALF TABLET BY MOUTH TWICE DAILY      OXYGEN 3 L       simvastatin (ZOCOR) 40 MG tablet TAKE ONE TABLET BY MOUTH AT BEDTIME      magnesium oxide (MAG-OX) 400 MG tablet Take 400 mg by mouth daily      furosemide (LASIX) 20 MG tablet Take 1-2 tablets by mouth daily (Patient taking differently: Take 20 mg by mouth See Admin Instructions Patient taking 1 tab daily, then 2 tabs daily, every other day) 180 tablet 0        CareTeam (Including outside providers/suppliers regularly involved in providing care):   Patient Care Team:  Austyn Read MD as PCP - General (Family Medicine)  Austyn Read MD as PCP [facility]  Kimberley Whitley MD as Consulting Physician (Pulmonology)     Reviewed and updated this visit:  Tobacco  Allergies  Meds  Problems  Med Hx  Surg Hx  Soc Hx  Fam Hx                  Advance Care Planning   Advanced Care Planning: Discussed the patients choices for care and treatment in case of a health event that adversely affects decision-making abilities. Also discussed the patients long-term treatment options. Reviewed with the patient the appropriate state-specific advance directive documents. Reviewed the process of designating a competent adult as an Agent (or -in-fact) that could take make health care decisions for the patient if incompetent. Patient was asked to complete the declaration forms, either acknowledge the forms by a public notary or an eligible witness and provide a signed copy to the practice office. Time spent (minutes): 5   Discussed DNR - she wishes to discuses with family. Cardiovascular Disease Risk Counseling: Assessed the patient's risk to develop cardiovascular disease and reviewed main risk factors.    Reviewed steps to reduce disease risk including:   · Quitting tobacco use, reducing amount smoked, or not starting the habit  · Making healthy food choices  · Being physically active and gradualy increasing activity levels   · Reduce weight and determine a healthy BMI goal  · Monitor blood pressure and treat if higher than 140/90 mmHg  · Maintain blood total cholesterol levels under 5 mmol/l or 190 mg/dl  · Maintain LDL cholesterol levels under 3.0 mmol/l or 115 mg/dl   · Control blood glucose levels  · Consider taking aspirin (75 mg daily), once blood pressure is controlled   Provided a follow up plan.   Time spent (minutes): 5

## 2022-06-03 ENCOUNTER — HOSPITAL ENCOUNTER (OUTPATIENT)
Dept: CARDIAC REHAB | Age: 77
Setting detail: THERAPIES SERIES
Discharge: HOME | End: 2022-06-03
Payer: MEDICARE

## 2022-06-03 PROCEDURE — G0239 OTH RESP PROC, GROUP: HCPCS

## 2022-06-10 ENCOUNTER — HOSPITAL ENCOUNTER (OUTPATIENT)
Dept: CARDIAC REHAB | Age: 77
Setting detail: THERAPIES SERIES
Discharge: HOME | End: 2022-06-10
Payer: MEDICARE

## 2022-06-10 PROCEDURE — G0239 OTH RESP PROC, GROUP: HCPCS

## 2022-06-13 ENCOUNTER — HOSPITAL ENCOUNTER (OUTPATIENT)
Dept: CARDIAC REHAB | Age: 77
Setting detail: THERAPIES SERIES
Discharge: HOME | End: 2022-06-13
Payer: MEDICARE

## 2022-06-13 PROCEDURE — G0239 OTH RESP PROC, GROUP: HCPCS

## 2022-06-14 ENCOUNTER — HOSPITAL ENCOUNTER (OUTPATIENT)
Dept: NUCLEAR MEDICINE | Age: 77
Discharge: HOME | End: 2022-06-14
Payer: MEDICARE

## 2022-06-14 ENCOUNTER — HOSPITAL ENCOUNTER (OUTPATIENT)
Dept: PULMONOLOGY | Age: 77
Discharge: HOME | End: 2022-06-14
Payer: MEDICARE

## 2022-06-14 ENCOUNTER — HOSPITAL ENCOUNTER (OUTPATIENT)
Dept: GENERAL RADIOLOGY | Age: 77
Discharge: HOME | End: 2022-06-14
Payer: MEDICARE

## 2022-06-14 DIAGNOSIS — J43.2 CENTRILOBULAR EMPHYSEMA (HCC): ICD-10-CM

## 2022-06-14 PROCEDURE — 3430000000 HC RX DIAGNOSTIC RADIOPHARMACEUTICAL: Performed by: INTERNAL MEDICINE

## 2022-06-14 PROCEDURE — 78597 LUNG PERFUSION DIFFERENTIAL: CPT

## 2022-06-14 PROCEDURE — A9540 TC99M MAA: HCPCS | Performed by: INTERNAL MEDICINE

## 2022-06-14 PROCEDURE — 71046 X-RAY EXAM CHEST 2 VIEWS: CPT

## 2022-06-14 PROCEDURE — 94618 PULMONARY STRESS TESTING: CPT

## 2022-06-14 RX ADMIN — Medication 6 MILLICURIE: at 11:49

## 2022-06-14 NOTE — PROGRESS NOTES
Six Minute Walk     [x]  Desaturation Screening []  Endurance Test       Name:  Boyd Trejo 89 Record Number:  [id number]  Age: 78 y.o. Gender: female  : 1945  Today's Date:  2022  Pulmonary History:Centrilobular Emphysema  Home Oxygen Therapy:  3 liters/min via nasal cannula  Home Respiratory Therapy:                   6 MINUTE WALK DATA    Modality Time (Minutes) SPO2 RR B/P Heart Rate O2 SOB Pain Level   Rest 2 93 14 90/59 98 R/A 0 0   Walk 1 92   90 R/A 2 0   Walk 2 87   97 R/A 4 0   Walk 3 83   100 R/A 5 0   Rest 4 99   98 3lpm 1 0   Walk 5 94   92 3lpm 2 0   Walk 6 92   98 3lpm 3 0   Recovery 2 98 14 99/61 90 3lpm 1 0     Patricia SOB Scale:  0=Nothing at all; 0.5=Very, very slight; 1=Very slight; 2=Slight; 3=Moderate; 4=Somewhat severe; 5=Severe; 7=Very Severe; 10=Very, very Severe    Exercise Summary:  Distance Walked (feet): 180  Lowest SpO2 During Walk:  83  (FIO2)  R/A  Walking Pace (Steps per Minute)  60  Stopped or Paused during Walk:  yes    Comments / Reason:  Pt stated that she has O2 at home and uses 3lpm most of the day. While on room air and walking Pox reading feel to 83%. Test was paused for placement of O2 at 3lpm NC. Walk continued while Pt was on O2 at 3lpm via NC Pox readings remained above 88% during that time and Pt stated she was less Short of Breath.      Electronically signed by John Tee RCP on 2022 at 11:46 AM     min

## 2022-06-17 ENCOUNTER — HOSPITAL ENCOUNTER (OUTPATIENT)
Dept: CARDIAC REHAB | Age: 77
Setting detail: THERAPIES SERIES
Discharge: HOME | End: 2022-06-17
Payer: MEDICARE

## 2022-06-17 PROCEDURE — G0239 OTH RESP PROC, GROUP: HCPCS

## 2022-06-20 ENCOUNTER — HOSPITAL ENCOUNTER (OUTPATIENT)
Dept: CARDIAC REHAB | Age: 77
Discharge: HOME | End: 2022-06-20

## 2022-06-27 ENCOUNTER — HOSPITAL ENCOUNTER (INPATIENT)
Age: 77
LOS: 4 days | Discharge: HOME HEALTH | DRG: 871 | End: 2022-07-01
Attending: EMERGENCY MEDICINE | Admitting: INTERNAL MEDICINE
Payer: MEDICARE

## 2022-06-27 ENCOUNTER — APPOINTMENT (OUTPATIENT)
Dept: GENERAL RADIOLOGY | Age: 77
DRG: 871 | End: 2022-06-27
Payer: MEDICARE

## 2022-06-27 ENCOUNTER — APPOINTMENT (OUTPATIENT)
Dept: CT IMAGING | Age: 77
DRG: 871 | End: 2022-06-27
Payer: MEDICARE

## 2022-06-27 DIAGNOSIS — J18.9 PNEUMONIA OF LEFT UPPER LOBE DUE TO INFECTIOUS ORGANISM: Primary | ICD-10-CM

## 2022-06-27 DIAGNOSIS — J18.9 PNEUMONIA OF RIGHT LUNG DUE TO INFECTIOUS ORGANISM, UNSPECIFIED PART OF LUNG: ICD-10-CM

## 2022-06-27 LAB
ALBUMIN SERPL-MCNC: 3 G/DL (ref 3.4–5)
ALP BLD-CCNC: 100 U/L (ref 40–129)
ALT SERPL-CCNC: 60 U/L (ref 10–40)
ANION GAP SERPL CALCULATED.3IONS-SCNC: 9 MMOL/L (ref 3–16)
AST SERPL-CCNC: 51 U/L (ref 15–37)
BANDED NEUTROPHILS RELATIVE PERCENT: 7 % (ref 0–7)
BASE EXCESS VENOUS: 8.7 MMOL/L (ref -2–3)
BASOPHILS ABSOLUTE: 0 K/UL (ref 0–0.2)
BASOPHILS RELATIVE PERCENT: 0 %
BILIRUB SERPL-MCNC: 0.7 MG/DL (ref 0–1)
BILIRUBIN DIRECT: <0.2 MG/DL (ref 0–0.3)
BILIRUBIN, INDIRECT: ABNORMAL MG/DL (ref 0–1)
BUN BLDV-MCNC: 34 MG/DL (ref 7–20)
CALCIUM SERPL-MCNC: 8.3 MG/DL (ref 8.3–10.6)
CARBOXYHEMOGLOBIN: 1.4 % (ref 0–1.5)
CHLORIDE BLD-SCNC: 92 MMOL/L (ref 99–110)
CO2: 31 MMOL/L (ref 21–32)
CREAT SERPL-MCNC: 0.7 MG/DL (ref 0.6–1.2)
EKG ATRIAL RATE: 97 BPM
EKG DIAGNOSIS: NORMAL
EKG P AXIS: -3 DEGREES
EKG P-R INTERVAL: 138 MS
EKG Q-T INTERVAL: 344 MS
EKG QRS DURATION: 78 MS
EKG QTC CALCULATION (BAZETT): 436 MS
EKG R AXIS: 83 DEGREES
EKG T AXIS: 63 DEGREES
EKG VENTRICULAR RATE: 97 BPM
EOSINOPHILS ABSOLUTE: 0 K/UL (ref 0–0.6)
EOSINOPHILS RELATIVE PERCENT: 0 %
GFR AFRICAN AMERICAN: >60
GFR NON-AFRICAN AMERICAN: >60
GLUCOSE BLD-MCNC: 112 MG/DL (ref 70–99)
HCO3 VENOUS: 35.9 MMOL/L (ref 24–28)
HCT VFR BLD CALC: 31.6 % (ref 36–48)
HEMOGLOBIN, VEN, REDUCED: 51.1 %
HEMOGLOBIN: 10.3 G/DL (ref 12–16)
LIPASE: 21 U/L (ref 13–60)
LYMPHOCYTES ABSOLUTE: 0 K/UL (ref 1–5.1)
LYMPHOCYTES RELATIVE PERCENT: 0 %
MCH RBC QN AUTO: 29.8 PG (ref 26–34)
MCHC RBC AUTO-ENTMCNC: 32.7 G/DL (ref 31–36)
MCV RBC AUTO: 91.1 FL (ref 80–100)
METHEMOGLOBIN VENOUS: 0.1 % (ref 0–1.5)
MONOCYTES ABSOLUTE: 0.4 K/UL (ref 0–1.3)
MONOCYTES RELATIVE PERCENT: 2 %
NEUTROPHILS ABSOLUTE: 22 K/UL (ref 1.7–7.7)
NEUTROPHILS RELATIVE PERCENT: 91 %
O2 SAT, VEN: 48 %
PCO2, VEN: 63.2 MMHG (ref 41–51)
PDW BLD-RTO: 13.5 % (ref 12.4–15.4)
PH VENOUS: 7.36 (ref 7.35–7.45)
PLATELET # BLD: 255 K/UL (ref 135–450)
PMV BLD AUTO: 8.3 FL (ref 5–10.5)
PO2, VEN: <30 MMHG (ref 25–40)
POTASSIUM REFLEX MAGNESIUM: 5.7 MMOL/L (ref 3.5–5.1)
POTASSIUM SERPL-SCNC: 5.6 MMOL/L (ref 3.5–5.1)
PRO-BNP: 549 PG/ML (ref 0–449)
RAPID INFLUENZA  B AGN: NEGATIVE
RAPID INFLUENZA A AGN: NEGATIVE
RBC # BLD: 3.46 M/UL (ref 4–5.2)
RBC # BLD: NORMAL 10*6/UL
SODIUM BLD-SCNC: 132 MMOL/L (ref 136–145)
T4 FREE: 1.4 NG/DL (ref 0.9–1.8)
TCO2 CALC VENOUS: 38 MMOL/L
TOTAL PROTEIN: 6.8 G/DL (ref 6.4–8.2)
TROPONIN: <0.01 NG/ML
TSH REFLEX: 0.58 UIU/ML (ref 0.27–4.2)
WBC # BLD: 22.4 K/UL (ref 4–11)

## 2022-06-27 PROCEDURE — 94761 N-INVAS EAR/PLS OXIMETRY MLT: CPT

## 2022-06-27 PROCEDURE — 36415 COLL VENOUS BLD VENIPUNCTURE: CPT

## 2022-06-27 PROCEDURE — 87040 BLOOD CULTURE FOR BACTERIA: CPT

## 2022-06-27 PROCEDURE — 71260 CT THORAX DX C+: CPT

## 2022-06-27 PROCEDURE — 71046 X-RAY EXAM CHEST 2 VIEWS: CPT

## 2022-06-27 PROCEDURE — 94640 AIRWAY INHALATION TREATMENT: CPT

## 2022-06-27 PROCEDURE — 6360000002 HC RX W HCPCS: Performed by: INTERNAL MEDICINE

## 2022-06-27 PROCEDURE — U0003 INFECTIOUS AGENT DETECTION BY NUCLEIC ACID (DNA OR RNA); SEVERE ACUTE RESPIRATORY SYNDROME CORONAVIRUS 2 (SARS-COV-2) (CORONAVIRUS DISEASE [COVID-19]), AMPLIFIED PROBE TECHNIQUE, MAKING USE OF HIGH THROUGHPUT TECHNOLOGIES AS DESCRIBED BY CMS-2020-01-R: HCPCS

## 2022-06-27 PROCEDURE — 2580000003 HC RX 258: Performed by: PHYSICIAN ASSISTANT

## 2022-06-27 PROCEDURE — U0005 INFEC AGEN DETEC AMPLI PROBE: HCPCS

## 2022-06-27 PROCEDURE — 99285 EMERGENCY DEPT VISIT HI MDM: CPT

## 2022-06-27 PROCEDURE — 96365 THER/PROPH/DIAG IV INF INIT: CPT

## 2022-06-27 PROCEDURE — 85025 COMPLETE CBC W/AUTO DIFF WBC: CPT

## 2022-06-27 PROCEDURE — 6360000002 HC RX W HCPCS: Performed by: PHYSICIAN ASSISTANT

## 2022-06-27 PROCEDURE — 83880 ASSAY OF NATRIURETIC PEPTIDE: CPT

## 2022-06-27 PROCEDURE — 84439 ASSAY OF FREE THYROXINE: CPT

## 2022-06-27 PROCEDURE — 6370000000 HC RX 637 (ALT 250 FOR IP): Performed by: INTERNAL MEDICINE

## 2022-06-27 PROCEDURE — 80048 BASIC METABOLIC PNL TOTAL CA: CPT

## 2022-06-27 PROCEDURE — 87804 INFLUENZA ASSAY W/OPTIC: CPT

## 2022-06-27 PROCEDURE — 84132 ASSAY OF SERUM POTASSIUM: CPT

## 2022-06-27 PROCEDURE — 83690 ASSAY OF LIPASE: CPT

## 2022-06-27 PROCEDURE — 84443 ASSAY THYROID STIM HORMONE: CPT

## 2022-06-27 PROCEDURE — 80076 HEPATIC FUNCTION PANEL: CPT

## 2022-06-27 PROCEDURE — 82803 BLOOD GASES ANY COMBINATION: CPT

## 2022-06-27 PROCEDURE — 84484 ASSAY OF TROPONIN QUANT: CPT

## 2022-06-27 PROCEDURE — 93005 ELECTROCARDIOGRAM TRACING: CPT | Performed by: PHYSICIAN ASSISTANT

## 2022-06-27 PROCEDURE — 6360000004 HC RX CONTRAST MEDICATION: Performed by: PHYSICIAN ASSISTANT

## 2022-06-27 PROCEDURE — 2700000000 HC OXYGEN THERAPY PER DAY

## 2022-06-27 PROCEDURE — 1200000000 HC SEMI PRIVATE

## 2022-06-27 PROCEDURE — 2580000003 HC RX 258: Performed by: INTERNAL MEDICINE

## 2022-06-27 RX ORDER — ACETAMINOPHEN 650 MG/1
650 SUPPOSITORY RECTAL EVERY 6 HOURS PRN
Status: DISCONTINUED | OUTPATIENT
Start: 2022-06-27 | End: 2022-07-01 | Stop reason: HOSPADM

## 2022-06-27 RX ORDER — ARFORMOTEROL TARTRATE 15 UG/2ML
15 SOLUTION RESPIRATORY (INHALATION) 2 TIMES DAILY
Status: DISCONTINUED | OUTPATIENT
Start: 2022-06-27 | End: 2022-06-27

## 2022-06-27 RX ORDER — SODIUM CHLORIDE 9 MG/ML
INJECTION, SOLUTION INTRAVENOUS PRN
Status: DISCONTINUED | OUTPATIENT
Start: 2022-06-27 | End: 2022-07-01 | Stop reason: HOSPADM

## 2022-06-27 RX ORDER — IPRATROPIUM BROMIDE AND ALBUTEROL SULFATE 2.5; .5 MG/3ML; MG/3ML
1 SOLUTION RESPIRATORY (INHALATION) EVERY 8 HOURS
Status: DISCONTINUED | OUTPATIENT
Start: 2022-06-28 | End: 2022-06-28

## 2022-06-27 RX ORDER — BUDESONIDE 0.5 MG/2ML
0.5 INHALANT ORAL 2 TIMES DAILY
Status: DISCONTINUED | OUTPATIENT
Start: 2022-06-27 | End: 2022-06-27

## 2022-06-27 RX ORDER — ONDANSETRON 4 MG/1
4 TABLET, ORALLY DISINTEGRATING ORAL EVERY 8 HOURS PRN
Status: DISCONTINUED | OUTPATIENT
Start: 2022-06-27 | End: 2022-07-01 | Stop reason: HOSPADM

## 2022-06-27 RX ORDER — ONDANSETRON 2 MG/ML
4 INJECTION INTRAMUSCULAR; INTRAVENOUS EVERY 6 HOURS PRN
Status: DISCONTINUED | OUTPATIENT
Start: 2022-06-27 | End: 2022-07-01 | Stop reason: HOSPADM

## 2022-06-27 RX ORDER — IPRATROPIUM BROMIDE AND ALBUTEROL SULFATE 2.5; .5 MG/3ML; MG/3ML
1 SOLUTION RESPIRATORY (INHALATION) EVERY 6 HOURS
Status: DISCONTINUED | OUTPATIENT
Start: 2022-06-27 | End: 2022-06-27

## 2022-06-27 RX ORDER — LISINOPRIL 10 MG/1
5 TABLET ORAL NIGHTLY
Status: DISCONTINUED | OUTPATIENT
Start: 2022-06-27 | End: 2022-06-28

## 2022-06-27 RX ORDER — SODIUM CHLORIDE 9 MG/ML
INJECTION, SOLUTION INTRAVENOUS CONTINUOUS
Status: ACTIVE | OUTPATIENT
Start: 2022-06-27 | End: 2022-06-27

## 2022-06-27 RX ORDER — ACETAMINOPHEN 325 MG/1
650 TABLET ORAL EVERY 6 HOURS PRN
Status: DISCONTINUED | OUTPATIENT
Start: 2022-06-27 | End: 2022-07-01 | Stop reason: HOSPADM

## 2022-06-27 RX ORDER — LANOLIN ALCOHOL/MO/W.PET/CERES
400 CREAM (GRAM) TOPICAL DAILY
Status: DISCONTINUED | OUTPATIENT
Start: 2022-06-28 | End: 2022-07-01 | Stop reason: HOSPADM

## 2022-06-27 RX ORDER — ATORVASTATIN CALCIUM 40 MG/1
40 TABLET, FILM COATED ORAL NIGHTLY
Status: DISCONTINUED | OUTPATIENT
Start: 2022-06-27 | End: 2022-07-01 | Stop reason: HOSPADM

## 2022-06-27 RX ORDER — SODIUM CHLORIDE 0.9 % (FLUSH) 0.9 %
5-40 SYRINGE (ML) INJECTION PRN
Status: DISCONTINUED | OUTPATIENT
Start: 2022-06-27 | End: 2022-07-01 | Stop reason: HOSPADM

## 2022-06-27 RX ORDER — ISOSORBIDE DINITRATE 10 MG/1
30 TABLET ORAL DAILY
Status: DISCONTINUED | OUTPATIENT
Start: 2022-06-28 | End: 2022-06-28

## 2022-06-27 RX ORDER — ASPIRIN 81 MG/1
81 TABLET ORAL DAILY
Status: DISCONTINUED | OUTPATIENT
Start: 2022-06-28 | End: 2022-07-01 | Stop reason: HOSPADM

## 2022-06-27 RX ORDER — POLYETHYLENE GLYCOL 3350 17 G/17G
17 POWDER, FOR SOLUTION ORAL DAILY PRN
Status: DISCONTINUED | OUTPATIENT
Start: 2022-06-27 | End: 2022-07-01 | Stop reason: HOSPADM

## 2022-06-27 RX ORDER — ENOXAPARIN SODIUM 100 MG/ML
40 INJECTION SUBCUTANEOUS DAILY
Status: DISCONTINUED | OUTPATIENT
Start: 2022-06-27 | End: 2022-07-01 | Stop reason: HOSPADM

## 2022-06-27 RX ORDER — SODIUM CHLORIDE 0.9 % (FLUSH) 0.9 %
5-40 SYRINGE (ML) INJECTION EVERY 12 HOURS SCHEDULED
Status: DISCONTINUED | OUTPATIENT
Start: 2022-06-27 | End: 2022-07-01 | Stop reason: HOSPADM

## 2022-06-27 RX ADMIN — IPRATROPIUM BROMIDE AND ALBUTEROL SULFATE 1 AMPULE: 2.5; .5 SOLUTION RESPIRATORY (INHALATION) at 17:32

## 2022-06-27 RX ADMIN — METOPROLOL TARTRATE 12.5 MG: 25 TABLET, FILM COATED ORAL at 21:24

## 2022-06-27 RX ADMIN — SODIUM CHLORIDE: 9 INJECTION, SOLUTION INTRAVENOUS at 17:22

## 2022-06-27 RX ADMIN — CEFTRIAXONE 1000 MG: 1 INJECTION, POWDER, FOR SOLUTION INTRAMUSCULAR; INTRAVENOUS at 13:24

## 2022-06-27 RX ADMIN — IOPAMIDOL 80 ML: 755 INJECTION, SOLUTION INTRAVENOUS at 12:04

## 2022-06-27 RX ADMIN — ATORVASTATIN CALCIUM 40 MG: 40 TABLET, FILM COATED ORAL at 21:24

## 2022-06-27 RX ADMIN — ENOXAPARIN SODIUM 40 MG: 100 INJECTION SUBCUTANEOUS at 21:28

## 2022-06-27 RX ADMIN — LISINOPRIL 5 MG: 10 TABLET ORAL at 21:24

## 2022-06-27 RX ADMIN — AZITHROMYCIN DIHYDRATE 500 MG: 500 INJECTION, POWDER, LYOPHILIZED, FOR SOLUTION INTRAVENOUS at 17:22

## 2022-06-27 ASSESSMENT — ENCOUNTER SYMPTOMS
BACK PAIN: 0
PHOTOPHOBIA: 0
NAUSEA: 0
EYE PAIN: 0
ABDOMINAL PAIN: 0
COUGH: 1
VOMITING: 0
SHORTNESS OF BREATH: 1
DIARRHEA: 0

## 2022-06-27 ASSESSMENT — PAIN - FUNCTIONAL ASSESSMENT: PAIN_FUNCTIONAL_ASSESSMENT: NONE - DENIES PAIN

## 2022-06-27 NOTE — ED PROVIDER NOTES
ED Attending Attestation Note     Date of evaluation: 6/27/2022    This patient was seen by the advance practice provider. I have seen and examined the patient, agree with the workup, evaluation, management and diagnosis. The care plan has been discussed. I have reviewed the ECG and concur with the DANIEL's interpretation. My assessment reveals awake alert female who is over the last 2 weeks has had no energy and lethargy. Really denies any specific cough. She is on home oxygen for COPD. She denies any chest pain recently no abdominal pain.   On exam she is awake alert he has decreased aeration but no overt wheezing     Jaswinder Frausto MD  06/27/22 [address], [place]  06/27/22 7340

## 2022-06-27 NOTE — ED PROVIDER NOTES
[address] ENCOUNTER          PHYSICIAN ASSISTANT NOTE       Date of evaluation: 6/27/2022    Chief Complaint     Shortness of Breath and Fatigue      History of Present Illness     Jennyfer Ashford is a 78 y.o. female with a PMH of HTN, HLD who presents to the emergency department fatigue, decreased appetite, shortness of breath. Patient states over the last 2 to 3 weeks she has noticed a significant amount of fatigue. She states she does not feel like getting up and doing anything around the house. She has also had a significant decrease in appetite. She does have some nausea in the morning, however has not had any vomiting. The nausea seems to resolve throughout the day. She denies any constipation or diarrhea. She does state that she has noticed a slight worsening in her chronic shortness of breath over this time as well. She has had a mild cough without significant sputum production. She is on 3 L of oxygen via nasal cannula at baseline. She denies associated chest pain, abdominal pain, vomiting, fevers, headache, dizziness, or other concerning symptoms. Review of Systems     Review of Systems   Constitutional: Positive for fatigue. Negative for chills and fever. HENT: Negative for congestion. Eyes: Negative for photophobia and pain. Respiratory: Positive for cough and shortness of breath. Cardiovascular: Negative for chest pain and palpitations. Gastrointestinal: Negative for abdominal pain, diarrhea, nausea and vomiting. Genitourinary: Negative for difficulty urinating and hematuria. Musculoskeletal: Negative for back pain and neck pain. Neurological: Negative for dizziness and headaches. Psychiatric/Behavioral: Negative for suicidal ideas.        Past Medical, Surgical, Family, and Social History     She has a past medical history of Age-related osteoporosis without current pathological fracture, Other emphysema ([address] Primary hypertension, Pure hypercholesterolemia, and PVD (peripheral vascular disease) ([facility] [address] She has a past surgical history that includes PERIPHERAL PERCUTANEOUS ARTERIAL INTERVENTION (Right, 2010); Hip fracture surgery (Left); and Ankle fracture surgery (Left). Her family history includes Coronary Art Dis in her brother; Heart Attack (age of onset: 46) in her mother; Heart Failure in her mother; Heart Failure (age of onset: 80) in her father. She reports that she quit smoking about 12 years ago. She started smoking about 52 years ago. She has a 40.00 pack-year smoking history. She has never used smokeless tobacco. She reports current alcohol use of about 7.0 standard drinks of alcohol per week. She reports that she does not use drugs. Medications     Previous Medications    ALBUTEROL SULFATE (PROAIR RESPICLICK) 213 (90 BASE) MCG/ACT AEROSOL POWDER INHALATION    Inhale 2 puffs into the lungs every 4 hours as needed for Wheezing or Shortness of Breath    ASPIRIN 81 MG EC TABLET    Take by mouth    FLUTICASONE-UMECLIDIN-VILANT (TRELEGY ELLIPTA) 200-62.5-25 MCG/INH AEPB    Inhale 1 puff into the lungs daily    FUROSEMIDE (LASIX) 20 MG TABLET    Take 1-2 tablets by mouth daily    IPRATROPIUM-ALBUTEROL (DUONEB) 0.5-2.5 (3) MG/3ML SOLN NEBULIZER SOLUTION    inhale one vial (3ml) via nebulizer every 6 hours    ISOSORBIDE DINITRATE (ISORDIL) 30 MG TABLET    TAKE ONE TABLET BY MOUTH DAILY    LISINOPRIL (PRINIVIL;ZESTRIL) 5 MG TABLET    TAKE ONE TABLET BY MOUTH DAILY    MAGNESIUM OXIDE (MAG-OX) 400 MG TABLET    Take 400 mg by mouth daily    METOPROLOL TARTRATE (LOPRESSOR) 25 MG TABLET    TAKE ONE-HALF TABLET BY MOUTH TWICE DAILY    NEBULIZERS (COMPRESSOR/NEBULIZER) MISC    1 Device by Does not apply route daily    NITROGLYCERIN (NITROSTAT) 0.4 MG SL TABLET    Place 0.4 mg under the tongue every 5 minutes as needed for Chest pain up to max of 3 total doses. If no relief after 1 dose, call 911.     OXYGEN    3 L     SIMVASTATIN (ZOCOR) 40 MG TABLET    TAKE ONE TABLET BY MOUTH AT BEDTIME    SPIRONOLACTONE (ALDACTONE) 25 MG TABLET    Take 1 tablet by mouth daily       Allergies     She has No Known Allergies. Physical Exam     INITIAL VITALS: BP: 108/62, Temp: 98.2 °F (36.8 °C), Heart Rate: 81, Resp: 22, SpO2: 97 %  Physical Exam  Constitutional:       General: She is not in acute distress. Appearance: She is well-developed. HENT:      Head: Normocephalic and atraumatic. Eyes:      Pupils: Pupils are equal, round, and reactive to light. Cardiovascular:      Rate and Rhythm: Normal rate and regular rhythm. Heart sounds: No murmur heard. No friction rub. No gallop. Pulmonary:      Effort: Pulmonary effort is normal. No respiratory distress. Breath sounds: Decreased breath sounds present. Abdominal:      Palpations: Abdomen is soft. Tenderness: There is no abdominal tenderness. Musculoskeletal:         General: Normal range of motion. Cervical back: Normal range of motion and neck supple. Skin:     General: Skin is warm and dry. Neurological:      Mental Status: She is alert and oriented to person, place, and time. Diagnostic Results     EKG   Interpreted in conjunction with emergency department physician No att. providers found  Rhythm: normal sinus   Rate: normal  Axis: normal  Ectopy: none  Conduction: normal  ST Segments: no acute change  T Waves: no acute change  Q Waves:nonspecific  Clinical Impression: no acute changes  Comparison:  11/22/2021    RADIOLOGY:  CT CHEST PULMONARY EMBOLISM W CONTRAST   Final Result      No evidence of pulmonary embolus. Extensive airspace disease in the left upper lobe and lingula with trace left-sided effusion. Findings consistent with pulmonary arterial hypertension. Extensive coronary artery calcification noted.       Nodules described in the right lung are stable when compared to the previous examination that the nodules in the left lung are scattered by airspace disease. Follow-up after clearing of infiltrate is recommended with CT of the chest to ensure nodule    stability. XR CHEST (2 VW)   Final Result   Impression: Increased left-sided pulmonary airspace opacity, as above.           LABS:   Results for orders placed or performed during the hospital encounter of 06/27/22   Rapid Flu Swab    Specimen: Nasopharyngeal   Result Value Ref Range    Rapid Influenza A Ag Negative Negative    Rapid Influenza B Ag Negative Negative   CBC with Auto Differential   Result Value Ref Range    WBC 22.4 (H) 4.0 - 11.0 K/uL    RBC 3.46 (L) 4.00 - 5.20 M/uL    Hemoglobin 10.3 (L) 12.0 - 16.0 g/dL    Hematocrit 31.6 (L) 36.0 - 48.0 %    MCV 91.1 80.0 - 100.0 fL    MCH 29.8 26.0 - 34.0 pg    MCHC 32.7 31.0 - 36.0 g/dL    RDW 13.5 12.4 - 15.4 %    Platelets 195 985 - 459 K/uL    MPV 8.3 5.0 - 10.5 fL    Neutrophils % 91.0 %    Lymphocytes % 0.0 %    Monocytes % 2.0 %    Eosinophils % 0.0 %    Basophils % 0.0 %    Neutrophils Absolute 22.0 (H) 1.7 - 7.7 K/uL    Lymphocytes Absolute 0.0 (L) 1.0 - 5.1 K/uL    Monocytes Absolute 0.4 0.0 - 1.3 K/uL    Eosinophils Absolute 0.0 0.0 - 0.6 K/uL    Basophils Absolute 0.0 0.0 - 0.2 K/uL    Bands Relative 7 0 - 7 %    RBC Morphology Normal    Basic Metabolic Panel w/ Reflex to MG   Result Value Ref Range    Sodium 132 (L) 136 - 145 mmol/L    Potassium reflex Magnesium 5.7 (H) 3.5 - 5.1 mmol/L    Chloride 92 (L) 99 - 110 mmol/L    CO2 31 21 - 32 mmol/L    Anion Gap 9 3 - 16    Glucose 112 (H) 70 - 99 mg/dL    BUN 34 (H) 7 - 20 mg/dL    CREATININE 0.7 0.6 - 1.2 mg/dL    GFR Non-African American >60 >60    GFR African American >60 >60    Calcium 8.3 8.3 - 10.6 mg/dL   Hepatic Function Panel   Result Value Ref Range    Total Protein 6.8 6.4 - 8.2 g/dL    Albumin 3.0 (L) 3.4 - 5.0 g/dL    Alkaline Phosphatase 100 40 - 129 U/L    ALT 60 (H) 10 - 40 U/L    AST 51 (H) 15 - 37 U/L    Total Bilirubin 0.7 0.0 - 1.0 mg/dL    Bilirubin, Direct <0.2 0.0 - 0.3 mg/dL    Bilirubin, Indirect see below 0.0 - 1.0 mg/dL   Lipase   Result Value Ref Range    Lipase 21.0 13.0 - 60.0 U/L   Troponin   Result Value Ref Range    Troponin <0.01 <0.01 ng/mL   Brain Natriuretic Peptide   Result Value Ref Range    Pro- (H) 0 - 449 pg/mL   Blood Gas, Venous   Result Value Ref Range    pH, Nirav 7.363 7.350 - 7.450    pCO2, Nirav 63.2 (H) 41.0 - 51.0 mmHg    pO2, Nirav <30.0 25.0 - 40.0 mmHg    HCO3, Venous 35.9 (H) 24.0 - 28.0 mmol/L    Base Excess, Nirav 8.7 (H) -2.0 - 3.0 mmol/L    O2 Sat, Nirav 48 Not established %    Carboxyhemoglobin 1.4 0.0 - 1.5 %    MetHgb, Nirav 0.1 0.0 - 1.5 %    TC02 (Calc), Nirav 38 mmol/L    Hemoglobin, Nirav, Reduced 51.10 %   EKG 12 Lead   Result Value Ref Range    Ventricular Rate 97 BPM    Atrial Rate 97 BPM    P-R Interval 138 ms    QRS Duration 78 ms    Q-T Interval 344 ms    QTc Calculation (Bazett) 436 ms    P Axis -3 degrees    R Axis 83 degrees    T Axis 63 degrees    Diagnosis       EKG performed in ER and to be interpreted by ER physician. Confirmed by MD, ER ([id number]),  Sharee Lora [phone]) on 6/27/2022 1:44:31 PM       RECENT VITALS:  BP: 108/62, Temp: 98.2 °F (36.8 °C), Heart Rate: 81, Resp: 22, SpO2: 97 %       ED Course     Nursing Notes, Past Medical Hx,Past Surgical Hx, Social Hx, Allergies, and Family Hx were reviewed.     The patient was given the following medications:  Orders Placed This Encounter   Medications    iopamidol (ISOVUE-370) 76 % injection 80 mL    cefTRIAXone (ROCEPHIN) 1000 mg IVPB in 50 mL D5W minibag     Order Specific Question:   Antimicrobial Indications     Answer:   Pneumonia (CAP)    azithromycin (ZITHROMAX) 500 mg in dextrose 5 % 250 mL IVPB     Order Specific Question:   Antimicrobial Indications     Answer:   Pneumonia (CAP)       CONSULTS:  IP CONSULT TO HOSPITALIST  IP CONSULT TO [address] [place] [address] / ASSESSMENT / Idris Navarro is a 78 y.o. female who presents the emergency room with fatigue, decreased appetite, shortness of breath. Vital signs stable on presentation remained stable throughout her stay. Thorough history and physical exam performed and detailed above. Patient states that over the last 2 to 3 weeks she has noticed significant fatigue and lack of motivation. She states she does not feel like doing anything around the house. She is also noticed a significant decrease in appetite. She does feel nauseated at times in the morning, however it resolves throughout the day. Denies any abdominal pain or vomiting. She has noticed some worsening of her chronic shortness of breath and endorses a mild nonproductive cough. Denies shortness of breath with lying flat. Denies lower extremity swelling. Denies any chest pain. She denies fever, sputum production, headache, dizziness, or other concerning symptoms. On physical exam heart rate and rhythm are regular. She does have decreased breath sounds bilaterally. Abdomen soft and nontender. No peripheral edema. EKG without ischemic changes or arrhythmia. VBG with a mildly elevated CO2 to 63.2, however pH was normal.  CBC with a significantly evaded white count to 22.4. BMP showed a potassium of 5.7, however this was hemolyzed therefore repeat was ordered. BUN mildly elevated at 34. Hepatic function panel with a slightly elevated ALT to 60 and AST to 51, however otherwise unremarkable. Lipase within normal limits. Troponin less than 0.01. . Chest x-ray did show increased left-sided pulmonary airspace opacity. CTPA was ordered to evaluate for possible pulmonary embolism and to better evaluate the airspace opacity. CTPA showed no evidence of PE. It did show extensive airspace disease in the left upper lobe and lingula with trace left-sided effusion. Influenza swab negative. COVID swab is pending at this time. Patient was started on Rocephin and azithromycin after blood cultures were obtained. Given patient's symptoms and significance of airspace disease I do believe she warrants admission to the hospital.  This was discussed with the patient was agreeable. Call was placed to hospitalist to discuss admission. This patient was also evaluated by the attending physician. All care plans were discussed and agreed upon. Clinical Impression     1. Pneumonia of left upper lobe due to infectious organism        Disposition     PATIENT REFERRED TO:  No follow-up provider specified.     DISCHARGE MEDICATIONS:  New Prescriptions    No medications on file       DISPOSITION  admit        Justin Owens PA-C  06/27/22 [id number]

## 2022-06-27 NOTE — H&P
Hospital Medicine History & Physical      PCP: Mushtaq Mcnamara MD    Date of Admission: 6/27/2022    Date of Service: Pt seen/examined on 6/27/2022 and Admitted to Inpatient with expected LOS greater than two midnights due to medical therapy. Chief Complaint:  Fatigue, low appetite      History Of Present Illness: The patient is a 78 y.o. 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 Patient is chronically short of breath but did notice worsening of her dyspnea as well. Denies any worsening cough or sputum production. No fevers or chills. Did have some vague left-sided chest discomfort during breathing. In the ER patient was afebrile with tachycardic. She underwent chest x-ray followed by CTPA which ruled out PE but showed left-sided airspace disease consistent with pneumonia. White cell count is elevated at 22.4. While being in the emergency room patient also became progressively more tachycardic. She takes low-dose metoprolol at home and took her morning medications today. She feels hungry but otherwise denies any acute distress. Past Medical History:        Diagnosis Date    Age-related osteoporosis without current pathological fracture 11/22/2021    Other emphysema ([address] 11/22/2021    Primary hypertension 11/22/2021    Pure hypercholesterolemia 11/22/2021    PVD (peripheral vascular disease) ([address] 11/22/2021       Past Surgical History:        Procedure Laterality Date    ANKLE FRACTURE SURGERY Left     HIP FRACTURE SURGERY Left     PERIPHERAL PERCUTANEOUS ARTERIAL INTERVENTION Right 2010    lower       Medications Prior to Admission:    Prior to Admission medications    Medication Sig Start Date End Date Taking?  Authorizing Provider   Nebulizers (COMPRESSOR/NEBULIZER) MISC 1 Device by Does not apply route daily 4/11/22   Elizabeth Kirkland MD   spironolactone (ALDACTONE) 25 MG tablet Take 1 tablet by mouth daily 4/5/22   Michael Moore MD   ipratropium-albuterol (DUONEB) 0.5-2.5 (3) MG/3ML SOLN nebulizer solution inhale one vial (3ml) via nebulizer every 6 hours 4/1/22   Rajan Hoover MD   isosorbide dinitrate (ISORDIL) 30 MG tablet TAKE ONE TABLET BY MOUTH DAILY 4/1/22   Samir Piper, APRN - CNP   Fluticasone-Umeclidin-Vilant (TRELEGY ELLIPTA) 200-62.5-25 MCG/INH AEPB Inhale 1 puff into the lungs daily  Patient taking differently: Inhale 1 puff into the lungs daily Takes at night 1/20/22   Elizabeth Kirkland MD   nitroGLYCERIN (NITROSTAT) 0.4 MG SL tablet Place 0.4 mg under the tongue every 5 minutes as needed for Chest pain up to max of 3 total doses. If no relief after 1 dose, call 911. Historical Provider, MD   aspirin 81 MG EC tablet Take by mouth    Historical Provider, MD   lisinopril (PRINIVIL;ZESTRIL) 5 MG tablet Take 5 mg by mouth at bedtime  10/14/21   Historical Provider, MD   metoprolol tartrate (LOPRESSOR) 25 MG tablet TAKE ONE-HALF TABLET BY MOUTH TWICE DAILY 11/1/21   Historical Provider, MD   OXYGEN 3 L     Historical Provider, MD   simvastatin (ZOCOR) 40 MG tablet TAKE ONE TABLET BY MOUTH AT BEDTIME 11/1/21   Historical Provider, MD   magnesium oxide (MAG-OX) 400 MG tablet Take 400 mg by mouth daily    Historical Provider, MD   furosemide (LASIX) 20 MG tablet Take 1-2 tablets by mouth daily  Patient taking differently: Take 20 mg by mouth daily  11/22/21   Rajan Hoover MD       Allergies:  Patient has no known allergies. Social History:  The patient currently lives at home    TOBACCO:   reports that she quit smoking about 12 years ago. She started smoking about 52 years ago. She has a 40.00 pack-year smoking history. She has never used smokeless tobacco.  ETOH:   reports current alcohol use of about 7.0 standard drinks of alcohol per week.       Family History:  Reviewed in detail and Positive as follows:        Problem Relation Age of Onset    Heart Attack Mother 46    Heart Failure Mother     Heart Failure Father 80    Coronary Art Dis Brother        REVIEW OF SYSTEMS:   Positive and negative as noted in the HPI. All other systems reviewed and negative. PHYSICAL EXAM:    /79   Pulse (!) 122   Temp 98.2 °F (36.8 °C)   Resp 19   Ht 5' (1.524 m)   Wt 115 lb 15.4 oz (52.6 kg)   SpO2 90%   BMI 22.65 kg/m²     General appearance: Chronically ill-appearing, no acute distress appears stated age and cooperative. HEENT Normal cephalic, atraumatic without obvious deformity. Pupils equal, round, and reactive to light. Extra ocular muscles intact. Conjunctivae/corneas clear. Neck: Supple, No jugular venous distention/bruits. Trachea midline without thyromegaly or adenopathy with full range of motion. Lungs: Very diminished bilaterally, there is some rhonchi on the left hemithorax  Heart: Tachycardia, regular rate and rhythm with Normal S1/S2 without murmurs, rubs or gallops, point of maximum impulse non-displaced  Abdomen: Soft, non-tender or non-distended without rigidity or guarding and positive bowel sounds all four quadrants. Extremities: No clubbing, cyanosis, or edema bilaterally. Skin: Skin color, texture, turgor normal.    Neurologic: Alert and oriented X 3, grossly non-focal.  Mental status: Alert, oriented, thought content appropriate.   Capillary refill is brisk  Peripheral pulses 2+    CXR:  I have reviewed the CXR with the following interpretation: Left upper and lower lobe infiltrate  EKG:  I have reviewed the EKG with the following interpretation: Sinus tachycardia    CBC   Recent Labs     06/27/22  1103   WBC 22.4*   HGB 10.3*   HCT 31.6*         RENAL  Recent Labs     06/27/22  1104 06/27/22  1402   *  --    K 5.7* 5.6*   CL 92*  --    CO2 31  --    BUN 34*  --    CREATININE 0.7  --      LFT'S  Recent Labs     06/27/22  1104   AST 51*   ALT 60* BILIDIR <0.2   BILITOT 0.7   ALKPHOS 100     COAG  No results for input(s): INR in the last 72 hours. CARDIAC ENZYMES  Recent Labs     06/27/22  1104   TROPONINI <0.01       U/A:  No results found for: NITRITE, COLORU, WBCUA, RBCUA, MUCUS, BACTERIA, CLARITYU, SPECGRAV, LEUKOCYTESUR, BLOODU, GLUCOSEU, AMORPHOUS    ABG  No results found for: ATM5NST, BEART, D6MHCLOR, PHART, THGBART, BAY7YYW, PO2ART, SQJ9PRH        Active Hospital Problems    Diagnosis Date Noted    Pneumonia [J18.9] 06/27/2022     Priority: Medium         ASSESSMENT/PLAN:    Pneumonia, likely gram-positive, community-acquired:  Obtain procalcitonin, blood cultures, urine Legionella and streptococcal antigen  Obtain respiratory culture if able to produce sputum  Rapid flu ruled out. COVID-19 pending. Treated with ceftriaxone azithromycin  Given patient's baseline severe COPD history we will consult with pulmonology    COPD with chronic hypoxic respiratory failure:  Normally on oxygen at home at 3 L  Does not appear to be in exacerbation now but is at risk given pneumonia  Continue home inhalers    Tachycardia: This could be consistent with sepsis. CTA ruled out PE. Tachycardia combined with elevated white cell count is consistent with sepsis in the view of pneumonia. Will give gentle fluid and watch respiratory status closely. Telemetry monitoring. Chronic diastolic CHF:  Patient is due for echocardiogram as outpatient-we will order. Hold spironolactone due to elevated potassium  Will likely resume Lasix tomorrow if stable  Continue low-dose metoprolol, Isordil  Patient follows with heart failure clinic-we will consult cardiology    DVT Prophylaxis: lovenox  Diet: ADULT DIET; Regular; Low Fat/Low Chol/High Fiber/NATHAN  Code Status: Full Code  PT/OT Eval Status: pending    Manohar Bond MD    Thank you Hortensia Chaves MD for the opportunity to be involved in this patient's care.  If you have any questions or concerns please feel free to contact me at [phone].

## 2022-06-27 NOTE — CONSULTS
Pharmacy Consult Note  - Admission Medication Reconciliation      Pharmacy consulted for reconciliation of gyqat-gn-xokfeqgzf medications. I reviewed Rx fill history via \"Complete Dispense Report\" in Epic, and spoke to patient on phone. The following changes made to jivxo-qe-hulctstgj medication list:    ADDED:  1) Fosamax 70 mg weekly on Sundays    Dose or Frequency CHANGE:  1) Furosemide- takes 20 mg daily (NOT alternating with 40 mg)    REMOVED:  1) Albuterol MDI       Current Outpatient Medications   Medication Instructions    aspirin 81 MG EC tablet Oral    Fluticasone-Umeclidin-Vilant (TRELEGY ELLIPTA) 200-62.5-25 MCG/INH AEPB 1 puff, Inhalation, DAILY    furosemide (LASIX) 20-40 mg, Oral, DAILY    ipratropium-albuterol (DUONEB) 0.5-2.5 (3) MG/3ML SOLN nebulizer solution inhale one vial (3ml) via nebulizer every 6 hours    isosorbide dinitrate (ISORDIL) 30 MG tablet TAKE ONE TABLET BY MOUTH DAILY    lisinopril (PRINIVIL;ZESTRIL) 5 mg, Oral, Nightly    magnesium oxide (MAG-OX) 400 mg, Oral, DAILY    metoprolol tartrate (LOPRESSOR) 25 MG tablet TAKE ONE-HALF TABLET BY MOUTH TWICE DAILY    Nebulizers (COMPRESSOR/NEBULIZER) MISC 1 Device, Does not apply, DAILY    nitroGLYCERIN (NITROSTAT) 0.4 mg, SubLINGual, EVERY 5 MIN PRN, up to max of 3 total doses. If no relief after 1 dose, call 911.  OXYGEN 3 L     simvastatin (ZOCOR) 40 MG tablet TAKE ONE TABLET BY MOUTH AT BEDTIME    spironolactone (ALDACTONE) 25 mg, Oral, DAILY       Thank you for the consult  Please call with any questions:    Jacque RODRIGUEZ   6/27/2022 2:55 PM    [phone] (office)  [phone] ([address] [place] [address])

## 2022-06-28 LAB
ANION GAP SERPL CALCULATED.3IONS-SCNC: 13 MMOL/L (ref 3–16)
BASOPHILS ABSOLUTE: 0 K/UL (ref 0–0.2)
BASOPHILS RELATIVE PERCENT: 0 %
BUN BLDV-MCNC: 28 MG/DL (ref 7–20)
CALCIUM SERPL-MCNC: 8.1 MG/DL (ref 8.3–10.6)
CHLORIDE BLD-SCNC: 95 MMOL/L (ref 99–110)
CO2: 29 MMOL/L (ref 21–32)
CREAT SERPL-MCNC: 0.7 MG/DL (ref 0.6–1.2)
EOSINOPHILS ABSOLUTE: 0.2 K/UL (ref 0–0.6)
EOSINOPHILS RELATIVE PERCENT: 1 %
GFR AFRICAN AMERICAN: >60
GFR NON-AFRICAN AMERICAN: >60
GLUCOSE BLD-MCNC: 114 MG/DL (ref 70–99)
HCT VFR BLD CALC: 32.1 % (ref 36–48)
HEMOGLOBIN: 10.6 G/DL (ref 12–16)
LACTIC ACID: 1.2 MMOL/L (ref 0.4–2)
LYMPHOCYTES ABSOLUTE: 0.7 K/UL (ref 1–5.1)
LYMPHOCYTES RELATIVE PERCENT: 4 %
MCH RBC QN AUTO: 29.9 PG (ref 26–34)
MCHC RBC AUTO-ENTMCNC: 33 G/DL (ref 31–36)
MCV RBC AUTO: 90.5 FL (ref 80–100)
MONOCYTES ABSOLUTE: 0.7 K/UL (ref 0–1.3)
MONOCYTES RELATIVE PERCENT: 4 %
NEUTROPHILS ABSOLUTE: 16.7 K/UL (ref 1.7–7.7)
NEUTROPHILS RELATIVE PERCENT: 91 %
PDW BLD-RTO: 13.4 % (ref 12.4–15.4)
PLATELET # BLD: 292 K/UL (ref 135–450)
PMV BLD AUTO: 8 FL (ref 5–10.5)
POTASSIUM REFLEX MAGNESIUM: 4.4 MMOL/L (ref 3.5–5.1)
PROCALCITONIN: 0.6 NG/ML (ref 0–0.15)
RBC # BLD: 3.55 M/UL (ref 4–5.2)
RBC # BLD: NORMAL 10*6/UL
SARS-COV-2, PCR: NOT DETECTED
SODIUM BLD-SCNC: 137 MMOL/L (ref 136–145)
WBC # BLD: 18.3 K/UL (ref 4–11)

## 2022-06-28 PROCEDURE — 99449 NTRPROF PH1/NTRNET/EHR 31/>: CPT | Performed by: INTERNAL MEDICINE

## 2022-06-28 PROCEDURE — 6370000000 HC RX 637 (ALT 250 FOR IP): Performed by: INTERNAL MEDICINE

## 2022-06-28 PROCEDURE — 94761 N-INVAS EAR/PLS OXIMETRY MLT: CPT

## 2022-06-28 PROCEDURE — 83605 ASSAY OF LACTIC ACID: CPT

## 2022-06-28 PROCEDURE — 97116 GAIT TRAINING THERAPY: CPT

## 2022-06-28 PROCEDURE — 6360000002 HC RX W HCPCS: Performed by: INTERNAL MEDICINE

## 2022-06-28 PROCEDURE — 99222 1ST HOSP IP/OBS MODERATE 55: CPT | Performed by: INTERNAL MEDICINE

## 2022-06-28 PROCEDURE — 94640 AIRWAY INHALATION TREATMENT: CPT

## 2022-06-28 PROCEDURE — 2700000000 HC OXYGEN THERAPY PER DAY

## 2022-06-28 PROCEDURE — 94669 MECHANICAL CHEST WALL OSCILL: CPT

## 2022-06-28 PROCEDURE — 36415 COLL VENOUS BLD VENIPUNCTURE: CPT

## 2022-06-28 PROCEDURE — 97162 PT EVAL MOD COMPLEX 30 MIN: CPT

## 2022-06-28 PROCEDURE — 1200000000 HC SEMI PRIVATE

## 2022-06-28 PROCEDURE — 80048 BASIC METABOLIC PNL TOTAL CA: CPT

## 2022-06-28 PROCEDURE — 2580000003 HC RX 258: Performed by: INTERNAL MEDICINE

## 2022-06-28 PROCEDURE — 84145 PROCALCITONIN (PCT): CPT

## 2022-06-28 PROCEDURE — 97530 THERAPEUTIC ACTIVITIES: CPT

## 2022-06-28 PROCEDURE — 85025 COMPLETE CBC W/AUTO DIFF WBC: CPT

## 2022-06-28 RX ORDER — IPRATROPIUM BROMIDE AND ALBUTEROL SULFATE 2.5; .5 MG/3ML; MG/3ML
1 SOLUTION RESPIRATORY (INHALATION) EVERY 6 HOURS
Status: DISCONTINUED | OUTPATIENT
Start: 2022-06-28 | End: 2022-06-28

## 2022-06-28 RX ORDER — IPRATROPIUM BROMIDE AND ALBUTEROL SULFATE 2.5; .5 MG/3ML; MG/3ML
1 SOLUTION RESPIRATORY (INHALATION) 3 TIMES DAILY
Status: DISCONTINUED | OUTPATIENT
Start: 2022-06-28 | End: 2022-06-28 | Stop reason: CLARIF

## 2022-06-28 RX ORDER — IPRATROPIUM BROMIDE AND ALBUTEROL SULFATE 2.5; .5 MG/3ML; MG/3ML
SOLUTION RESPIRATORY (INHALATION)
Status: COMPLETED
Start: 2022-06-28 | End: 2022-06-29

## 2022-06-28 RX ORDER — IPRATROPIUM BROMIDE AND ALBUTEROL SULFATE 2.5; .5 MG/3ML; MG/3ML
1 SOLUTION RESPIRATORY (INHALATION) 3 TIMES DAILY
Status: DISCONTINUED | OUTPATIENT
Start: 2022-06-28 | End: 2022-07-01 | Stop reason: HOSPADM

## 2022-06-28 RX ORDER — GUAIFENESIN 600 MG/1
600 TABLET, EXTENDED RELEASE ORAL 2 TIMES DAILY
Status: DISCONTINUED | OUTPATIENT
Start: 2022-06-28 | End: 2022-07-01 | Stop reason: HOSPADM

## 2022-06-28 RX ORDER — ARFORMOTEROL TARTRATE 15 UG/2ML
15 SOLUTION RESPIRATORY (INHALATION) 2 TIMES DAILY
Status: DISCONTINUED | OUTPATIENT
Start: 2022-06-28 | End: 2022-07-01 | Stop reason: HOSPADM

## 2022-06-28 RX ADMIN — GUAIFENESIN 600 MG: 600 TABLET, EXTENDED RELEASE ORAL at 12:42

## 2022-06-28 RX ADMIN — CEFTRIAXONE 1000 MG: 1 INJECTION, POWDER, FOR SOLUTION INTRAMUSCULAR; INTRAVENOUS at 12:48

## 2022-06-28 RX ADMIN — AZITHROMYCIN DIHYDRATE 500 MG: 500 INJECTION, POWDER, LYOPHILIZED, FOR SOLUTION INTRAVENOUS at 17:59

## 2022-06-28 RX ADMIN — ATORVASTATIN CALCIUM 40 MG: 40 TABLET, FILM COATED ORAL at 20:12

## 2022-06-28 RX ADMIN — METOPROLOL TARTRATE 12.5 MG: 25 TABLET, FILM COATED ORAL at 17:42

## 2022-06-28 RX ADMIN — SODIUM CHLORIDE, PRESERVATIVE FREE 10 ML: 5 INJECTION INTRAVENOUS at 10:04

## 2022-06-28 RX ADMIN — GUAIFENESIN 600 MG: 600 TABLET, EXTENDED RELEASE ORAL at 20:12

## 2022-06-28 RX ADMIN — SODIUM CHLORIDE, PRESERVATIVE FREE 10 ML: 5 INJECTION INTRAVENOUS at 20:13

## 2022-06-28 RX ADMIN — Medication 400 MG: at 10:04

## 2022-06-28 RX ADMIN — IPRATROPIUM BROMIDE AND ALBUTEROL SULFATE 1 AMPULE: 2.5; .5 SOLUTION RESPIRATORY (INHALATION) at 07:49

## 2022-06-28 RX ADMIN — ARFORMOTEROL TARTRATE 15 MCG: 15 SOLUTION RESPIRATORY (INHALATION) at 21:42

## 2022-06-28 RX ADMIN — IPRATROPIUM BROMIDE AND ALBUTEROL SULFATE 1 AMPULE: .5; 3 SOLUTION RESPIRATORY (INHALATION) at 16:11

## 2022-06-28 RX ADMIN — METOPROLOL TARTRATE 12.5 MG: 25 TABLET, FILM COATED ORAL at 10:04

## 2022-06-28 RX ADMIN — ASPIRIN 81 MG: 81 TABLET, COATED ORAL at 10:04

## 2022-06-28 RX ADMIN — IPRATROPIUM BROMIDE AND ALBUTEROL SULFATE 1 AMPULE: 2.5; .5 SOLUTION RESPIRATORY (INHALATION) at 21:43

## 2022-06-28 RX ADMIN — ENOXAPARIN SODIUM 40 MG: 100 INJECTION SUBCUTANEOUS at 17:42

## 2022-06-28 ASSESSMENT — PAIN SCALES - GENERAL: PAINLEVEL_OUTOF10: 0

## 2022-06-28 NOTE — PROCEDURES
[address]               [address] [place], [address]                               PULMONARY FUNCTION    PATIENT NAME: Andrés Clement                 :        1945  MED REC NO:   [id number]                          ROOM:  ACCOUNT NO:   [de-identified]                           ADMIT DATE: 2022  PROVIDER:     Nadege Lynne MD    DATE OF PROCEDURE:  2022    SIX-MINUTE WALK TEST    On six-minute walk test, the patient's resting O2 sat was 93%. With  ambulation, her oxygen saturation dropped to 83% at three-minute adriano,  and she had replaced on three liters of oxygen to maintain saturations  above 88. She was only able to walk a distance of 180 feet and did have  to stop during her ambulation. CONCLUSION:  Significantly limited six-minute walk with only a 180-feet  walk and significant exertional hypoxemia requiring 3 liters of oxygen  to maintain saturations above 88%.         Karen Marino MD    D: 2022 8:12:06       T: 2022 9:38:41     [email or web address]  Job#: [id number]     Doc#: [id number]    CC:

## 2022-06-28 NOTE — PROGRESS NOTES
Physical Therapy  Facility/Department: 1 [facility] EMERGENCY DEPARTMENT  Physical Therapy Initial Assessment and Treatment    Name: Eden Neil  : 1945  MRN: [id number]  Date of Service: 2022    Discharge Recommendations:  Anoop Almodovar scored a 19/24 on the AM-PAC short mobility form. Current research shows that an AM-PAC score of 18 or greater is typically associated with a discharge to the patient's home setting. Based on the patient's AM-PAC score and their current functional mobility deficits, it is recommended that the patient have 2-3 sessions per week of Physical Therapy at d/c to increase the patient's independence. At this time, this patient demonstrates the endurance and safety to discharge home. Please see assessment section for further patient specific details. PT Equipment Recommendations  Equipment Needed: No        Assessment   Assessment: Pt from home with shortness of breath and PNA. Pt demonstrates decreased transfers and gait from functional baseline. Activity tolerance is limited by dyspnea. Spo2 decreased to 85% on 3L after ambulation, but quickly increased back to 90s with seated rest.  Will continue to follow for ongoing PT to increase strength and maximize mobility. Recommend daily activity with RN staff. Pt would benefit from home PT evaluation. Treatment Diagnosis: Decreased gait associated with PNA. Decision Making: Medium Complexity  Requires PT Follow-Up: Yes     Plan   Plan:  (2-5)  Current Treatment Recommendations: Transfer training,Functional mobility training,Strengthening,Gait training    Safety Devices  Type of Devices: Left in bed,Bed alarm in place,Call light within reach,Nurse notified     Restrictions  Up as tolerated     Subjective   Chart Reviewed: Yes  Additional Pertinent Hx: Pt to ED  with shortness of breath and fatigue. Pt admitted for PNA. CXR: left-sided pulmonary airspace opacity. Chest CT (-) for PE.   Covid-19 test pending. PMH:  PVD, HTN, emphysema, Hip fx  Diagnosis: PNA    Subjective  Subjective: Pt found supine in bed. \"I've been feeling kind of weak and tired lately. \"   Pt denies pain. Social/Functional History  Lives With: Son (daughter-in-law)  Type of Home: House  Home Layout: Able to Live on Main level with bedroom/bathroom  Home Access: Stairs to enter with rails (1 step)  Bathroom Shower/Tub: Walk-in shower  Bathroom Toilet: Standard ([facility] for leverage)  Bathroom Equipment: Hand-held shower,Grab bars in shower,Shower chair  Home Equipment: Oxygen,Cane,Walker, rolling,Rollator (2L Home O2, transport chair)  Has the patient had two or more falls in the past year or any fall with injury in the past year?: No  ADL Assistance: Independent  Homemaking Assistance: Needs assistance (family does [de-identified] of homemaking- pt can do light duties at home)  Ambulation Assistance: Independent  Transfer Assistance: Independent  Active : No (son transports)  Occupation: Retired ()  Additional Comments: Son is a pediatrician. Pt reports she has been feeling weak/fatigued for about 2 weeks.     Vision/Hearing  Vision: Within Functional Limits (wears glasses for watching TV and reading)  Hearing: Within functional limits      Cognition   Within Functional Limits     Objective   Gross Assessment  AROM: Within functional limits  Strength: Generally decreased, functional     Bed mobility  Supine to Sit: Stand by assistance (HOB raised)  Sit to Supine: Stand by assistance (bed flat)     Transfers  Sit to Stand: Contact guard assistance  Stand to sit: Contact guard assistance     Ambulation  Device: No Device  Other Apparatus: O2 (3L)  Assistance: Contact guard assistance  Gait Deviations: Slow Kezia;Decreased step height;Decreased step length  Distance: 30ft x2  Comments: SpO2 decreased to 85% on 3L with ambulation, but quickly rebounded back to 90s with seated rest.    Balance  Sitting - Static: Good  Sitting - Dynamic: Good  Standing - Static: Fair  Standing - Dynamic: Fair (CGA with transfers/gait)      AM-PAC Score  AM-PAC Inpatient Mobility Raw Score : 19 (06/28/22 1409)  AM-PAC Inpatient T-Scale Score : 45.44 (06/28/22 1409)  Mobility Inpatient CMS 0-100% Score: 41.77 (06/28/22 1409)  Mobility Inpatient CMS G-Code Modifier : CK (06/28/22 1409)    Goals  Short Term Goals  Time Frame for Short term goals: Discharge  Short term goal 1: supine <> sit modified independent  Short term goal 2: sit <> stand supervision  Short term goal 3: ambulate 200ft with/without assistive device supervision  Patient Goals   Patient goals : Get stronger       Education  Patient Education  Education Given To: Patient  Education Provided: Role of Therapy  Education Method: Verbal  Education Outcome: Verbalized understanding      Therapy Time   Individual Concurrent Group Co-treatment   Time In 1312         Time Out 1353         Minutes 41         Timed Code Treatment Minutes:  25  Total Treatment Minutes:  [address] 22, PT

## 2022-06-28 NOTE — PROGRESS NOTES
..4 Eyes Admission Assessment     I agree as the admission nurse that 2 RN's have performed a thorough Head to Toe Skin Assessment on the patient. ALL assessment sites listed below have been assessed on admission. Areas assessed by both nurses:   [x]   Head, Face, and Ears   [x]   Shoulders, Back, and Chest  [x]   Arms, Elbows, and Hands   [x]   Coccyx, Sacrum, and Ischium  [x]   Legs, Feet, and Heels        Does the Patient have Skin Breakdown?   No         Eddi Prevention initiated:  No   Wound Care Orders initiated:  No      [facility] nurse consulted for Pressure Injury (Stage 3,4, Unstageable, DTI, NWPT, and Complex wounds) or Eddi score 18 or lower:  No      Nurse 1 eSignature: Electronically signed by Homa Perez RN on 6/28/22 at 6:48 PM EDT    **SHARE this note so that the co-signing nurse is able to place an eSignature**    Nurse 2 eSignature: Electronically signed by Kimberley Gillespie RN on 6/28/22 at 7:11 PM EDT

## 2022-06-28 NOTE — CONSULTS
Pulmonary Consult Note      Reason for Consult: Acute on chronic hypoxemic respiratory failure, left-sided pneumonia  Requesting Physician: Jojo Omalley  Subjective:     CHIEF COMPLAINT / HPI:                The patient is a 78 y.o. female with significant past medical history of hypertension, severe COPD, chronic hypoxemic respiratory failure presented with complaints of fatigue, lethargy and shortness of breath. Patient is a chronically ill woman on 3 L of oxygen with exertion who has not been feeling well for couple of weeks. She had a chest x-ray 2 weeks ago as part of work-up for endobronchial valves that was clear, however she has been feeling less than herself for the past couple of weeks and even stopped going to pulmonary rehab a couple times last week because she was feeling so poorly. She tried to go to pulmonary rehab yesterday but told her son that she did not feel up to it and instead of taking her to pulmonary rehab he brought her to the emergency room. Despite the large area of airspace disease on imaging she has not had fever or chills just general malaise, fatigue and more shortness of breath than is her baseline. She denies hypoxia at home although she is on 6 L at rest currently which is double what she wears for exertion at baseline.     Past Medical History:      Diagnosis Date    Age-related osteoporosis without current pathological fracture 11/22/2021    Other emphysema ([address] 11/22/2021    Primary hypertension 11/22/2021    Pure hypercholesterolemia 11/22/2021    PVD (peripheral vascular disease) ([address] 11/22/2021      Past Surgical History:        Procedure Laterality Date    ANKLE FRACTURE SURGERY Left     HIP FRACTURE SURGERY Left     PERIPHERAL PERCUTANEOUS ARTERIAL INTERVENTION Right 2010    lower     Current Medications:     guaiFENesin  600 mg Oral BID    metoprolol tartrate  12.5 mg Oral BID    Arformoterol Tartrate  15 mcg Nebulization BID    ipratropium-albuterol  1 ampule Inhalation TID    ipratropium-albuterol        sodium chloride flush  5-40 mL IntraVENous 2 times per day    enoxaparin  40 mg SubCUTAneous Daily    cefTRIAXone (ROCEPHIN) IV  1,000 mg IntraVENous Q24H    azithromycin  500 mg IntraVENous Q24H    aspirin  81 mg Oral Daily    magnesium oxide  400 mg Oral Daily    atorvastatin  40 mg Oral Nightly     Allergies:  No Known Allergies  Social History:    TOBACCO:   reports that she quit smoking about 12 years ago. She started smoking about 52 years ago. She has a 40.00 pack-year smoking history. She has never used smokeless tobacco.  ETOH:   reports current alcohol use of about 7.0 standard drinks of alcohol per week. Family History:       Problem Relation Age of Onset    Heart Attack Mother 46    Heart Failure Mother     Heart Failure Father 80    Coronary Art Dis Brother        REVIEW OF SYSTEMS:    CONSTITUTIONAL:  negative for fevers, chills, and unexpected weight change. positive for activity change, appetite change, fatigue, night sweats .    EYES:  negative for blurred vision, eye discharge, visual disturbance and icterus  HEENT:  negative for hearing loss, tinnitus, ear drainage, sinus pressure, nasal congestion, epistaxis and snoring  RESPIRATORY:  See HPI  CARDIOVASCULAR:  negative for chest pain, palpitations, exertional chest pressure/discomfort, edema, syncope  GASTROINTESTINAL:  negative for nausea, vomiting, diarrhea, constipation, blood in stool and abdominal pain  GENITOURINARY:  negative for frequency, dysuria, urinary incontinence, decreased urine volume, and hematuria  HEMATOLOGIC/LYMPHATIC:  negative for easy bruising, bleeding and lymphadenopathy  ALLERGIC/IMMUNOLOGIC:  negative for recurrent infections, angioedema, anaphylaxis and drug reactions  ENDOCRINE:  negative for weight changes and diabetic symptoms including polyuria, polydipsia and polyphagia  MUSCULOSKELETAL:  negative for  pain, joint swelling, decreased range of motion and muscle weakness  NEUROLOGICAL:  negative for headaches, slurred speech, unilateral weakness  PSYCHIATRIC/BEHAVIORAL: negative for hallucinations, behavioral problems, confusion and agitation. Objective:   PHYSICAL EXAM:      VITALS:  /70   Pulse (!) 107   Temp 98.5 °F (36.9 °C) (Oral)   Resp 15   Ht 5' (1.524 m)   Wt 115 lb 15.4 oz (52.6 kg)   SpO2 94%   BMI 22.65 kg/m²   24HR INTAKE/OUTPUT:      Intake/Output Summary (Last 24 hours) at 2022 1712  Last data filed at 2022 1004  Gross per 24 hour   Intake 510 ml   Output --   Net 510 ml     CURRENT PULSE OXIMETRY:  SpO2: 94 %  24HR PULSE OXIMETRY RANGE:  SpO2  Av.2 %  Min: 89 %  Max: 100 % on 6 L    CONSTITUTIONAL:  awake, alert, cooperative, mild distress, and appears stated age  NECK:  Supple, symmetrical, trachea midline, no adenopathy, thyroid symmetric, not enlarged and no tenderness, skin normal  LUNGS: Mild increased work of breathing with left-sided rhonchi but no wheezes. Mild accessory muscle use  CARDIOVASCULAR:  normal S1 and S2, no edema and no JVD  ABDOMEN:  normal bowel sounds, non-distended and no masses palpated, and no tenderness to palpation. No hepatospleenomegaly  LYMPHADENOPATHY:  no axillary or supraclavicular adenopathy. No cervical adnenopathy  PSYCHIATRIC: Oriented to person place and time. No obvious depression or anxiety. MUSCULOSKELETAL: No obvious misalignment or effusion of the joints. No clubbing, cyanosis of the digits. SKIN:  normal skin color, texture, turgor and no redness, warmth, or swelling.  No palpable nodules    DATA:    Old records have been reviewed  CBC with Differential:    Lab Results   Component Value Date    WBC 18.3 2022    RBC 3.55 2022    HGB 10.6 2022    HCT 32.1 2022     2022    MCV 90.5 2022    MCH 29.9 2022    MCHC 33.0 2022    RDW 13.4 2022    BANDSPCT 7 2022    LYMPHOPCT 4.0 2022    MONOPCT 4.0 06/28/2022    BASOPCT 0.0 06/28/2022    MONOSABS 0.7 06/28/2022    LYMPHSABS 0.7 06/28/2022    EOSABS 0.2 06/28/2022    BASOSABS 0.0 06/28/2022     BMP:    Lab Results   Component Value Date     06/28/2022    K 4.4 06/28/2022    CL 95 06/28/2022    CO2 29 06/28/2022    BUN 28 06/28/2022    CREATININE 0.7 06/28/2022    CALCIUM 8.1 06/28/2022    GFRAA >60 06/28/2022    LABGLOM >60 06/28/2022    GLUCOSE 114 06/28/2022     Hepatic Function Panel:    Lab Results   Component Value Date    ALKPHOS 100 06/27/2022    ALT 60 06/27/2022    AST 51 06/27/2022    PROT 6.8 06/27/2022    BILITOT 0.7 06/27/2022    BILIDIR <0.2 06/27/2022    IBILI see below 06/27/2022     ABG:  No results found for: OAW9TVM, BEART, O3OSUXLL, PHART, THGBART, YVV5DSD, PO2ART, KMS0DEM    Cultures:   Blood Culture:    Sputum Culture:        Radiology Review:  All pertinent images / reports were reviewed as a part of this visit. imaging reveals the following:  CT CHEST PULMONARY EMBOLISM W CONTRAST   Final Result      No evidence of pulmonary embolus. Extensive airspace disease in the left upper lobe and lingula with trace left-sided effusion. Findings consistent with pulmonary arterial hypertension. Extensive coronary artery calcification noted. Nodules described in the right lung are stable when compared to the previous examination that the nodules in the left lung are scattered by airspace disease. Follow-up after clearing of infiltrate is recommended with CT of the chest to ensure nodule    stability. XR CHEST (2 VW)   Final Result   Impression: Increased left-sided pulmonary airspace opacity, as above. Other diagnostic test:      PFTs:1/2022  IMPRESSION:  1. Severe obstructive defect is present. 2.  There is no significant response to bronchodilators. 3.  Air trapping and hyperinflation are present. 4.  There is a severe decrease in diffusion capacity. 5. PFTs are consistent with very severe COPD. In the right clinical  situation, patient could be a candidate for endobronchial valves. Echo:12/21  Summary   Normal left ventricle size, wall thickness, and systolic function with an   estimated ejection fraction of >60%. No regional wall motion abnormalities are seen. Indeterminate diastolic function. There is an atrial septal aneurysm seen. Trivial mitral regurgitation. Mild aortic regurgitation. Moderate tricuspid regurgitation. Trivial pulmonic regurgitation present. Estimated pulmonary artery systolic pressure is at 69 mmHg assuming a right   atrial pressure of 3 mmHg. A bubble study was performed and shows evidence of right to left shunting   consistent with a patent foramen ovale or atrial septal defect. Assessment/Plan   78 y.o. female with 72-year-old patient with severe COPD, chronic hypoxemic respiratory failure and left-sided pneumonia. Left-sided pneumonia: Patient on appropriate therapy with Rocephin and azithromycin. We do not have any previous records of infections or respiratory cultures so would get a respiratory culture this admission. She has risk factors for Pseudomonas with her chronic lung disease but fortunately has been able to avoid healthcare settings for the most part. Acute on chronic hypoxemic respiratory failure: Secondary to combination of COPD and her acute pneumonia. Continue her home medications for COPD and antibiotics as above. COVID test is pending but based on the distribution of airspace disease and its unilaterality I be very surprised if she is COVID-positive.     Jessika Milan MD

## 2022-06-28 NOTE — CONSULTS
Due to limiting exposure to COVID-19 and minimizing use of PPE, note was completed solely using EMR and from personal conversations with primary medical team and/or consultants involved in case. Patient was not interviewed or examined. I have personally reviewed the reports and images of labs, radiological studies, cardiac studies including ECG's and telemetry, current and old medical records. The note was completed using EMR and Dragon dictation system. Every effort was made to ensure accuracy; however, inadvertent computerized transcription errors may be present. CODES 32950 (up to 30 min), 98260 (>30 min). I have spent 35 minutes reviewing EMR as above and formulating my assessment and plan. Briefly, patient is a 81-year-old woman with history of PAD, PAD, HTN, severe COPD on 3 L of supplemental oxygen, HFpEF, PFO, pulmonary hypertension. Echo 12/2021: Normal LV, LVEF greater than 60%, normal RV, indeterminate diastolic function, mild AI, moderate TR, RVSP 69 (3) negative bubble study. Patient presented to the emergency room on 6/27 with decreased appetite, fatigue, dyspnea over the last 2 weeks. CTPA was negative for PE, revealed left lower lobe consolidation suggestive of pneumonia. On personal review of images, there were no pleural effusions bilaterally, no evidence of pulmonary edema or congestion. WBC was elevated 22 with neutrophilic predominance. She was admitted for community-acquired pneumonia. Cardiology was consulted in view of her history of heart failure. She is being ruled out for COVID. Assessment and plan:  1. Dyspnea. It is most likely due to community-acquired pneumonia. There is no evidence of acute/decompensated diastolic heart failure. Patient is currently on IV antibiotics per primary team.  2.  Chronic HFpEF. It is well compensated. 3.  HTN. BP well controlled. 4.  Severe COPD on 3 L of supplemental oxygen at baseline  5. PAT.         - We will stop home dose lisinopril 5 mg daily and Imdur 30 mg daily to avoid hypotension  - Hold diuretics including Lasix and spironolactone  - Highly recommend continuing Lopressor 12.5 mg twice daily and uptitrating as needed to prevent PAT in the setting of pneumonia and possible sepsis. - Strict I's and O's every shift and standing weights if possible, low-salt diet, daily BMP with reflex to Mg (correct lytes for goals K >4.0 and Mg > 2.0) and wean supplemental oxygen to off (or down to baseline supplemental oxygen requirements) for sats greater than 92-94%.  -No need to repeat echo. I would like to thank you for providing me the opportunity to participate in the care of your patient. If you have any questions, please do not hesitate to contact me.      Sharon Escobar MD, [facility] [place], [address] [facility]  The [address] [facility]  [address] [address]  Ph: [phone]  Fax: [phone]

## 2022-06-28 NOTE — RT PROTOCOL NOTE
RT Nebulizer Bronchodilator Protocol Note    There is a bronchodilator order in the chart from a provider indicating to follow the RT Bronchodilator Protocol and there is an Initiate RT Bronchodilator Protocol order as well (see protocol at bottom of note). CXR Findings:  No results found. The findings from the last RT Protocol Assessment were as follows:  Smoking: Chronic pulmonary disease  Respiratory Pattern: Regular pattern and RR 12-20 bpm  Breath Sounds: Slightly diminished and/or crackles  Cough: Strong, spontaneous, non-productive  Indication for Bronchodilator Therapy:    Bronchodilator Assessment Score: 4    Aerosolized bronchodilator medication orders have not been revised according to the RT Nebulizer Bronchodilator Protocol below. Pt takes HHNs three times a day at home so will continue that here. Respiratory Therapist to perform RT Therapy Protocol Assessment initially then follow the protocol. Repeat RT Therapy Protocol Assessment PRN for score 0-3 or on second treatment, BID, and PRN for scores above 3. No Indications - adjust the frequency to every 6 hours PRN wheezing or bronchospasm, if no treatments needed after 48 hours then discontinue using Per Protocol order mode. If indication present, adjust the RT bronchodilator orders based on the Bronchodilator Assessment Score as indicated below. If a patient is on this medication at home then do not decrease Frequency below that used at home. 0-3 - enter or revise RT bronchodilator order(s) to equivalent RT Bronchodilator order with Frequency of every 4 hours PRN for wheezing or increased work of breathing using Per Protocol order mode. 4-6 - enter or revise RT Bronchodilator order(s) to two equivalent RT bronchodilator orders with one order with BID Frequency and one order with Frequency of every 4 hours PRN wheezing or increased work of breathing using Per Protocol order mode.          7-10 - enter or revise RT Bronchodilator order(s) to two equivalent RT bronchodilator orders with one order with TID Frequency and one order with Frequency of every 4 hours PRN wheezing or increased work of breathing using Per Protocol order mode. 11-13 - enter or revise RT Bronchodilator order(s) to one equivalent RT bronchodilator order with QID Frequency and an Albuterol order with Frequency of every 4 hours PRN wheezing or increased work of breathing using Per Protocol order mode. Greater than 13 - enter or revise RT Bronchodilator order(s) to one equivalent RT bronchodilator order with every 4 hours Frequency and an Albuterol order with Frequency of every 2 hours PRN wheezing or increased work of breathing using Per Protocol order mode. RT to enter RT Home Evaluation for COPD & MDI Assessment order using Per Protocol order mode.     Electronically signed by Dalton Ridley RCP on 6/28/2022 at 8:31 AM

## 2022-06-28 NOTE — PROGRESS NOTES
Hospitalist Progress Note      PCP: Nando Luque MD    Date of Admission: 2022    Chief Complaint on Admission: low energy, poor appetite    Pt Seen/Examined and Chart Reviewed. Admitting dx PNA, sepsis    SUBJECTIVE/OBJECTIVE:   Admitted from home with low energy and poor appetite. Was found to have sepsis and pneumonia. Started on antibiotics. Today patient overall feels about the same, not better not worse. Starting to cough with some clear phlegm. HR improved compared to yesterday. Awaiting covid results. Allergies  Patient has no known allergies. Medications      Scheduled Meds:   ipratropium-albuterol  1 ampule Inhalation TID    sodium chloride flush  5-40 mL IntraVENous 2 times per day    enoxaparin  40 mg SubCUTAneous Daily    cefTRIAXone (ROCEPHIN) IV  1,000 mg IntraVENous Q24H    azithromycin  500 mg IntraVENous Q24H    aspirin  81 mg Oral Daily    isosorbide dinitrate  30 mg Oral Daily    lisinopril  5 mg Oral Nightly    magnesium oxide  400 mg Oral Daily    metoprolol tartrate  12.5 mg Oral BID    atorvastatin  40 mg Oral Nightly       Infusions:   sodium chloride         PRN Meds:  sodium chloride flush, sodium chloride, ondansetron **OR** ondansetron, polyethylene glycol, acetaminophen **OR** acetaminophen, perflutren lipid microspheres    Vitals    TEMPERATURE:  Current - Temp: 98.6 °F (37 °C);  Max - Temp  Av.5 °F (36.4 °C)  Min: 95.2 °F (35.1 °C)  Max: 98.6 °F (37 °C)  RESPIRATIONS RANGE: Resp  Av.9  Min: 16  Max: 27  PULSE RANGE: Pulse  Av.6  Min: 86  Max: 122  BLOOD PRESSURE RANGE:  Systolic (80HNE), QAQ:888 , Min:90 , LBE:579   ; Diastolic (40PJY), CARMEN:07, Min:62, Max:79    PULSE OXIMETRY RANGE: SpO2  Av.8 %  Min: 68 %  Max: 100 %  24HR INTAKE/OUTPUT:      Intake/Output Summary (Last 24 hours) at 2022 1034  Last data filed at 2022 1004  Gross per 24 hour   Intake 510 ml   Output --   Net 510 ml       Exam:      General appearance: chronically ill appearing, No acute distress, appears stated age and cooperative. Lungs: diminished clear on the right with coarse rhonchi on the left  Heart: Regular rate and rhythm with Normal S1/S2 without  murmurs, rubs or gallops, point of maximum impulse non-displaced  Abdomen: Soft, non-tender or non-distended without rigidity or guarding and positive bowel sounds all four quadrants. Extremities: No clubbing, cyanosis, or edema bilaterally. Skin: Skin color, texture, turgor normal.    Neurologic: Alert and oriented X 3,   grossly non-focal.  Mental status: Alert, oriented, thought content appropriate. Data    Recent Labs     06/27/22  1103 06/28/22  0827   WBC 22.4* 18.3*   HGB 10.3* 10.6*   HCT 31.6* 32.1*    292      Recent Labs     06/27/22  1104 06/27/22  1402 06/28/22  0827   *  --  137   K 5.7* 5.6* 4.4   CL 92*  --  95*   CO2 31  --  29   BUN 34*  --  28*   CREATININE 0.7  --  0.7     Recent Labs     06/27/22  1104   AST 51*   ALT 60*   BILIDIR <0.2   BILITOT 0.7   ALKPHOS 100     No results for input(s): INR in the last 72 hours. Recent Labs     06/27/22  1104   TROPONINI <0.01       Consults:     IP CONSULT TO HOSPITALIST  IP CONSULT TO PHARMACY  IP CONSULT TO PULMONOLOGY  IP CONSULT TO CARDIOLOGY    Active Hospital Problems    Diagnosis Date Noted    Pneumonia [J18.9] 06/27/2022     Priority: Medium         ASSESSMENT AND PLAN      Pneumonia, likely gram-positive, community-acquired:  pending procalcitonin, blood cultures, urine Legionella and streptococcal antigen  Obtain respiratory culture if able to produce sputum  Rapid flu ruled out. COVID-19 pending.    cont with ceftriaxone azithromycin  Given patient's baseline severe COPD history we consulted pulmonology (patient of Dr. Ramses Aimn).     COPD with chronic hypoxic respiratory failure:  Normally on oxygen at home at 3 L  Does not appear to be in exacerbation now but is at risk given pneumonia  Continue home inhalers     Tachycardia: This is consistent with sepsis and PNA. CTA ruled out PE. Given gentle IVF last evening. Hold further fluids now. Telemetry monitoring.      Chronic diastolic CHF:  Patient is due for echocardiogram as outpatient-placed order. Held spironolactone due to elevated potassium- normalized this am  Will likely resume Lasix/tati tomorrow if stable  Continue low-dose metoprolol, Isordil  Patient follows with heart failure clinic-we consulted cardiology    DVT Prophylaxis: lovenox  Diet: ADULT DIET;  Regular; Low Fat/Low Chol/High Fiber/NATHAN  Code Status: Full Code    PT/OT Eval Status:ordered    Dispo - inpt    Mandeep Donahue MD

## 2022-06-29 ENCOUNTER — APPOINTMENT (OUTPATIENT)
Dept: GENERAL RADIOLOGY | Age: 77
DRG: 871 | End: 2022-06-29
Payer: MEDICARE

## 2022-06-29 LAB
ANION GAP SERPL CALCULATED.3IONS-SCNC: 13 MMOL/L (ref 3–16)
BASOPHILS ABSOLUTE: 0 K/UL (ref 0–0.2)
BASOPHILS RELATIVE PERCENT: 0 %
BUN BLDV-MCNC: 23 MG/DL (ref 7–20)
CALCIUM SERPL-MCNC: 8.1 MG/DL (ref 8.3–10.6)
CHLORIDE BLD-SCNC: 97 MMOL/L (ref 99–110)
CO2: 24 MMOL/L (ref 21–32)
CREAT SERPL-MCNC: 0.6 MG/DL (ref 0.6–1.2)
EOSINOPHILS ABSOLUTE: 0.3 K/UL (ref 0–0.6)
EOSINOPHILS RELATIVE PERCENT: 2 %
GFR AFRICAN AMERICAN: >60
GFR NON-AFRICAN AMERICAN: >60
GLUCOSE BLD-MCNC: 87 MG/DL (ref 70–99)
HCT VFR BLD CALC: 33.6 % (ref 36–48)
HEMOGLOBIN: 10.9 G/DL (ref 12–16)
LYMPHOCYTES ABSOLUTE: 0.8 K/UL (ref 1–5.1)
LYMPHOCYTES RELATIVE PERCENT: 5 %
MAGNESIUM: 2.1 MG/DL (ref 1.8–2.4)
MCH RBC QN AUTO: 29.9 PG (ref 26–34)
MCHC RBC AUTO-ENTMCNC: 32.4 G/DL (ref 31–36)
MCV RBC AUTO: 92 FL (ref 80–100)
MONOCYTES ABSOLUTE: 1 K/UL (ref 0–1.3)
MONOCYTES RELATIVE PERCENT: 6 %
NEUTROPHILS ABSOLUTE: 14.4 K/UL (ref 1.7–7.7)
NEUTROPHILS RELATIVE PERCENT: 87 %
OCCULT BLOOD DIAGNOSTIC: NORMAL
PDW BLD-RTO: 13.8 % (ref 12.4–15.4)
PLATELET # BLD: 317 K/UL (ref 135–450)
PMV BLD AUTO: 7.9 FL (ref 5–10.5)
POTASSIUM SERPL-SCNC: 5.2 MMOL/L (ref 3.5–5.1)
RBC # BLD: 3.65 M/UL (ref 4–5.2)
SODIUM BLD-SCNC: 134 MMOL/L (ref 136–145)
WBC # BLD: 16.6 K/UL (ref 4–11)

## 2022-06-29 PROCEDURE — 80048 BASIC METABOLIC PNL TOTAL CA: CPT

## 2022-06-29 PROCEDURE — 97165 OT EVAL LOW COMPLEX 30 MIN: CPT

## 2022-06-29 PROCEDURE — 94640 AIRWAY INHALATION TREATMENT: CPT

## 2022-06-29 PROCEDURE — 94669 MECHANICAL CHEST WALL OSCILL: CPT

## 2022-06-29 PROCEDURE — 83735 ASSAY OF MAGNESIUM: CPT

## 2022-06-29 PROCEDURE — 6370000000 HC RX 637 (ALT 250 FOR IP): Performed by: INTERNAL MEDICINE

## 2022-06-29 PROCEDURE — 71045 X-RAY EXAM CHEST 1 VIEW: CPT

## 2022-06-29 PROCEDURE — 2700000000 HC OXYGEN THERAPY PER DAY

## 2022-06-29 PROCEDURE — 99233 SBSQ HOSP IP/OBS HIGH 50: CPT | Performed by: INTERNAL MEDICINE

## 2022-06-29 PROCEDURE — 6360000002 HC RX W HCPCS: Performed by: INTERNAL MEDICINE

## 2022-06-29 PROCEDURE — 82270 OCCULT BLOOD FECES: CPT

## 2022-06-29 PROCEDURE — 6370000000 HC RX 637 (ALT 250 FOR IP)

## 2022-06-29 PROCEDURE — 1200000000 HC SEMI PRIVATE

## 2022-06-29 PROCEDURE — 94761 N-INVAS EAR/PLS OXIMETRY MLT: CPT

## 2022-06-29 PROCEDURE — 97530 THERAPEUTIC ACTIVITIES: CPT

## 2022-06-29 PROCEDURE — 36415 COLL VENOUS BLD VENIPUNCTURE: CPT

## 2022-06-29 PROCEDURE — 99232 SBSQ HOSP IP/OBS MODERATE 35: CPT | Performed by: INTERNAL MEDICINE

## 2022-06-29 PROCEDURE — 85025 COMPLETE CBC W/AUTO DIFF WBC: CPT

## 2022-06-29 PROCEDURE — 97535 SELF CARE MNGMENT TRAINING: CPT

## 2022-06-29 PROCEDURE — 87449 NOS EACH ORGANISM AG IA: CPT

## 2022-06-29 PROCEDURE — 2580000003 HC RX 258: Performed by: INTERNAL MEDICINE

## 2022-06-29 RX ORDER — FUROSEMIDE 10 MG/ML
40 INJECTION INTRAMUSCULAR; INTRAVENOUS ONCE
Status: COMPLETED | OUTPATIENT
Start: 2022-06-29 | End: 2022-06-29

## 2022-06-29 RX ADMIN — IPRATROPIUM BROMIDE AND ALBUTEROL SULFATE 1 AMPULE: 2.5; .5 SOLUTION RESPIRATORY (INHALATION) at 15:06

## 2022-06-29 RX ADMIN — AZITHROMYCIN DIHYDRATE 500 MG: 500 INJECTION, POWDER, LYOPHILIZED, FOR SOLUTION INTRAVENOUS at 17:30

## 2022-06-29 RX ADMIN — SODIUM CHLORIDE, PRESERVATIVE FREE 10 ML: 5 INJECTION INTRAVENOUS at 20:24

## 2022-06-29 RX ADMIN — METOPROLOL TARTRATE 25 MG: 25 TABLET, FILM COATED ORAL at 20:24

## 2022-06-29 RX ADMIN — GUAIFENESIN 600 MG: 600 TABLET, EXTENDED RELEASE ORAL at 20:24

## 2022-06-29 RX ADMIN — FUROSEMIDE 40 MG: 10 INJECTION, SOLUTION INTRAMUSCULAR; INTRAVENOUS at 10:29

## 2022-06-29 RX ADMIN — METOPROLOL TARTRATE 12.5 MG: 25 TABLET, FILM COATED ORAL at 10:28

## 2022-06-29 RX ADMIN — METOPROLOL TARTRATE 12.5 MG: 25 TABLET, FILM COATED ORAL at 09:09

## 2022-06-29 RX ADMIN — ENOXAPARIN SODIUM 40 MG: 100 INJECTION SUBCUTANEOUS at 17:30

## 2022-06-29 RX ADMIN — ASPIRIN 81 MG: 81 TABLET, COATED ORAL at 09:09

## 2022-06-29 RX ADMIN — ATORVASTATIN CALCIUM 40 MG: 40 TABLET, FILM COATED ORAL at 20:24

## 2022-06-29 RX ADMIN — ARFORMOTEROL TARTRATE 15 MCG: 15 SOLUTION RESPIRATORY (INHALATION) at 08:43

## 2022-06-29 RX ADMIN — SODIUM CHLORIDE, PRESERVATIVE FREE 10 ML: 5 INJECTION INTRAVENOUS at 09:10

## 2022-06-29 RX ADMIN — IPRATROPIUM BROMIDE AND ALBUTEROL SULFATE 1 AMPULE: 2.5; .5 SOLUTION RESPIRATORY (INHALATION) at 08:43

## 2022-06-29 RX ADMIN — GUAIFENESIN 600 MG: 600 TABLET, EXTENDED RELEASE ORAL at 09:09

## 2022-06-29 RX ADMIN — ARFORMOTEROL TARTRATE 15 MCG: 15 SOLUTION RESPIRATORY (INHALATION) at 19:40

## 2022-06-29 RX ADMIN — CEFTRIAXONE 1000 MG: 1 INJECTION, POWDER, FOR SOLUTION INTRAMUSCULAR; INTRAVENOUS at 12:34

## 2022-06-29 RX ADMIN — Medication 400 MG: at 09:09

## 2022-06-29 RX ADMIN — IPRATROPIUM BROMIDE AND ALBUTEROL SULFATE 1 AMPULE: 2.5; .5 SOLUTION RESPIRATORY (INHALATION) at 19:40

## 2022-06-29 NOTE — PROGRESS NOTES
Cardiology Consult Service  Daily Progress Note        Admit Date:  6/27/2022  Primary cardiologist: Dr Malik Valle    Reason for Consultation/Chief Complaint: PNA    Subjective:     Patient is a 72-year-old woman with history of PAD, PAD, HTN, severe COPD on 3 L of supplemental oxygen, HFpEF, PFO, pulmonary hypertension.     Echo 12/2021: Normal LV, LVEF greater than 60%, normal RV, indeterminate diastolic function, mild AI, moderate TR, RVSP 69 (3) negative bubble study.     Patient presented to the emergency room on 6/27 with decreased appetite, fatigue, dyspnea over the last 2 weeks. CTPA was negative for PE, revealed left lower lobe consolidation suggestive of pneumonia. On personal review of images, there were no pleural effusions bilaterally, no evidence of pulmonary edema or congestion. WBC was elevated 22 with neutrophilic predominance. She was admitted for community-acquired pneumonia. Cardiology was consulted in view of her history of heart failure. She is being ruled out for COVID. COVID negative    Home diuretics: Lasix 20 mg daily and spironolactone 25 mg daily. Interval history:  Patient today with increased oxygen requirements at 5 L from 3 L. Chest x-ray continues to show left-sided consolidation consistent with pneumonia. On personal review, I cannot exclude mild deterioration, could be superimposed pulmonary edema. 18 stable 8.6, bicarbonate decreasing at 24 from 31.     Objective:     Medications:   metoprolol tartrate  25 mg Oral BID    guaiFENesin  600 mg Oral BID    Arformoterol Tartrate  15 mcg Nebulization BID    ipratropium-albuterol  1 ampule Inhalation TID    sodium chloride flush  5-40 mL IntraVENous 2 times per day    enoxaparin  40 mg SubCUTAneous Daily    cefTRIAXone (ROCEPHIN) IV  1,000 mg IntraVENous Q24H    azithromycin  500 mg IntraVENous Q24H    aspirin  81 mg Oral Daily    magnesium oxide  400 mg Oral Daily    atorvastatin  40 mg Oral Nightly       IV drips:   sodium chloride         PRN:  sodium chloride flush, sodium chloride, ondansetron **OR** ondansetron, polyethylene glycol, acetaminophen **OR** acetaminophen, perflutren lipid microspheres    Vitals:    06/29/22 1028 06/29/22 1230 06/29/22 1506 06/29/22 1651   BP: 102/63 106/64  108/68   Pulse: 99 (!) 106  (!) 108   Resp:  19  18   Temp:  98.5 °F (36.9 °C)  98.1 °F (36.7 °C)   TempSrc:  Oral  Oral   SpO2:  94% 99% 92%   Weight:       Height:           Intake/Output Summary (Last 24 hours) at 6/29/2022 1712  Last data filed at 6/29/2022 0231  Gross per 24 hour   Intake 240 ml   Output 300 ml   Net -60 ml     I/O last 3 completed shifts: In: 250 [P.O.:240; I.V.:10]  Out: 300 [Urine:300]  Wt Readings from Last 3 Encounters:   06/29/22 110 lb 9.6 oz (50.2 kg)   06/01/22 109 lb (49.4 kg)   05/04/22 110 lb (49.9 kg)       Admit Wt: Weight: 115 lb 15.4 oz (52.6 kg)   Todays Wt: Weight: 110 lb 9.6 oz (50.2 kg)    TELEMETRY personally reviewed: Sinus tach    Physical Exam:         General Appearance:  Alert, cooperative, no distress, appears stated age Appropriate weight   Head:  Normocephalic, without obvious abnormality, atraumatic   Eyes:  PERRL, conjunctiva/corneas clear EOM intact  Ears normal   Throat no lesions       Nose: Nares normal, no drainage or sinus tenderness   Throat: Lips, mucosa, and tongue normal   Neck: Supple, symmetrical, trachea midline, no adenopathy, thyroid: not enlarged, symmetric, no tenderness/mass/nodules, no carotid bruit. Lungs:   Normal respiratory rate, lungs with L >> R ronchi. Chest Wall:  No tenderness or deformity   Heart:  Regular rhythm, rate tach, S1, S2 normal, there is no murmur, there is no rub or gallop, cannot assess jvd, no bilateral lower extremity edema   Abdomen:   Soft, non-tender, bowel sounds active all four quadrants,  no masses, no organomegaly       Extremities: Extremities normal, atraumatic, no cyanosis.     Pulses: 2+ and symmetric   Skin: Skin color, texture, turgor normal, no rashes or lesions   Pysch: Normal mood and affect   Neurologic: Normal gross motor and sensory exam.  Cranial nerves intact       Labs:   Recent Labs     06/27/22  1104 06/27/22  1402 06/28/22  0827 06/29/22  0831   *  --  137 134*   K 5.7* 5.6* 4.4 5.2*   BUN 34*  --  28* 23*   CREATININE 0.7  --  0.7 0.6   CL 92*  --  95* 97*   CO2 31  --  29 24   GLUCOSE 112*  --  114* 87   CALCIUM 8.3  --  8.1* 8.1*   MG  --   --   --  2.10     Recent Labs     06/27/22  1103 06/27/22  1103 06/28/22  0827 06/28/22  0827 06/29/22  0831   WBC 22.4*  --  18.3*  --  16.6*   HGB 10.3*  --  10.6*  --  10.9*   HCT 31.6*  --  32.1*  --  33.6*     --  292  --  317   MCV 91.1   < > 90.5   < > 92.0    < > = values in this interval not displayed. No results for input(s): CHOLTOT, TRIG, HDL, CHOLHDL, LDL in the last 72 hours. Invalid input(s): Lolis Citron  No results for input(s): PTT, INR in the last 72 hours. Invalid input(s): PT  Recent Labs     06/27/22  1104   TROPONINI <0.01     No results for input(s): BNP in the last 72 hours. No results for input(s): NTPROBNP in the last 72 hours. No results for input(s): TSH in the last 72 hours. Imaging:       Assessment & Plan:     1. Dyspnea. It is most likely due to community-acquired pneumonia. There is no evidence of acute/decompensated diastolic heart failure on admission, though I cannot exclude mild superimposed edema today. Patient is currently on IV antibiotics per primary team.  2.  Chronic HFpEF. It is well compensated. 3.  HTN. BP well controlled. 4.  Severe COPD on 3 L of supplemental oxygen at baseline  5. PAT.            - We will stop home dose lisinopril 5 mg daily and Imdur 30 mg daily to avoid hypotension  - Will give one dose of lasix 40 mg iv today  -  Increase Lopressor to 25 mg twice daily.  - Strict I's and O's every shift and standing weights if possible, low-salt diet, daily BMP with reflex to Mg (correct lytes for goals K >4.0 and Mg > 2.0) and wean supplemental oxygen to off (or down to baseline supplemental oxygen requirements) for sats greater than 92-94%.  -No need to repeat echo.          I have spent 35 minutes of face to face time with the patient with more than 50% spent counseling and coordinating care. I have personally reviewed the reports and images of labs, radiological studies, cardiac studies including ECG's and telemetry, current and old medical records. The note was completed using EMR and Dragon dictation system. Every effort was made to ensure accuracy; however, inadvertent computerized transcription errors may be present. All questions and concerns were addressed to the patient/family. Alternatives to my treatment were discussed. Thank you for allowing to us to participate in the care or Anoop Almodovar. Please call our service with questions.     Duong Carlson MD, [facility], [address]  The [address]  [address] [address]  Ph: [phone]  Fax: [phone]

## 2022-06-29 NOTE — CARE COORDINATION
Case Management Assessment           Initial Evaluation                Date / Time of Evaluation: 6/29/2022 2:50 PM                 Assessment Completed by: Leslie Bassett RN    Patient Name: Young Freitas     YOB: 1945  Diagnosis: Pneumonia [J18.9]  Pneumonia of left upper lobe due to infectious organism [J18.9]     Date / Time: 6/27/2022  9:46 AM    Patient Admission Status: Inpatient    If patient is discharged prior to next notation, then this note serves as note for discharge by case management. Current PCP: Adri Nunez MD  Clinic Patient: No    Chart Reviewed: Yes  Patient/ Family Interviewed: Yes    Initial assessment completed at bedside with: pt     Hospitalization in the last 30 days: No    Emergency Contacts:  Extended Emergency Contact Information  Primary Emergency Contact: Sandra Neil  Address: [address] [facility], [facility] Phone: [phone]  Relation: Other  Secondary Emergency Contact: Frank [address] Phone: [phone]  Relation: Child    Advance Directives:   Code Status: Full Code    Financial  Payor: Meagan Mathias / Plan: Kota Lopez ESSENTIAL/PLUS / Product Type: *No Product type* /     Pre-cert required for SNF: Yes    Pharmacy    [id number] [address]  [address]  Phone: [phone] Fax: [phone]      Potential assistance Purchasing Medications:    Does Patient want to participate in local refill/ meds to beds program?:      Meds To Beds General Rules:  1. Can ONLY be done Monday- Friday between 8:30am-5pm  2. Prescription(s) must be in pharmacy by 3pm to be filled same day  3. Copy of patient's insurance/ prescription drug card and patient face sheet must be sent along with the prescription(s)  4. Cost of Rx cannot be added to hospital bill.  If financial assistance is needed, please contact unit  or ;  or  CANNOT provide pharmacy voucher for patients co-pays  5.  Patients can then  the prescription on their way out of the hospital at discharge, or pharmacy can deliver to the bedside if staff is available. (payment due at time of pick-up or delivery - cash, check, or card accepted)     Able to afford home medications/ co-pay costs: Yes    ADLS  Support Systems:      PT AM-PAC:   OT AM-PAC:     HOUSING  Home Environment:   Home  W/  DIL    Lives With: Son (daughter-in-law)  Type of Home: House  Home Layout: Able to Live on Main level with bedroom/bathroom  Home Access: Stairs to enter with rails (1 step)  Bathroom Shower/Tub: Walk-in shower  Bathroom Toilet: Standard (vanity for leverage)  Bathroom Equipment: Hand-held shower,Grab bars in shower,Shower chair  Home Equipment: Oxygen,Cane,Walker, rolling,Rollator (2L Home O2, transport chair)  Has the patient had two or more falls in the past year or any fall with injury in the past year?: No  ADL Assistance: [address]: Needs assistance (family does majority of homemaking- pt can do light duties at home)  Ambulation Assistance: Independent  Transfer Assistance: Independent  Active : No (son transports)  Occupation: Retired ()  Steps: 1 Step into home  Can  Live  ALL on one level    Plans to RETURN to current housing: Yes  Barriers to RETURNING to current housin [address]  Currently ACTIVE with  [facility] [address]: No  Home Care Agency: Not Applicable    Currently ACTIVE with [place] on Aging: No  Passport/ Waiver: No  Passport/ Waiver Services: Not [address]  DME Provider: LEXI  Equipment: Oxygen,Cane,Walker, rolling,Rollator (2L Home O2, transport chair)      Home Oxygen and [address] prior to admission: Yes  Kathy [address]: [facility]  Phone:

## 2022-06-29 NOTE — PROGRESS NOTES
ill appearing, No acute distress, appears stated age and cooperative. Lungs: diminished clear on the right with coarse rhonchi on the left  Heart: Regular rate and rhythm with Normal S1/S2 without  murmurs, rubs or gallops, point of maximum impulse non-displaced  Abdomen: Soft, non-tender or non-distended without rigidity or guarding and positive bowel sounds all four quadrants. Extremities: No clubbing, cyanosis, or edema bilaterally. Skin: Skin color, texture, turgor normal.    Neurologic: Alert and oriented X 3,   grossly non-focal.  Mental status: Alert, oriented, thought content appropriate. Data    Recent Labs     06/27/22  1103 06/28/22  0827 06/29/22  0831   WBC 22.4* 18.3* 16.6*   HGB 10.3* 10.6* 10.9*   HCT 31.6* 32.1* 33.6*    292 317      Recent Labs     06/27/22  1104 06/27/22  1402 06/28/22  0827 06/29/22  0831   *  --  137 134*   K 5.7* 5.6* 4.4 5.2*   CL 92*  --  95* 97*   CO2 31  --  29 24   BUN 34*  --  28* 23*   CREATININE 0.7  --  0.7 0.6     Recent Labs     06/27/22  1104   AST 51*   ALT 60*   BILIDIR <0.2   BILITOT 0.7   ALKPHOS 100     No results for input(s): INR in the last 72 hours. Recent Labs     06/27/22  1104   TROPONINI <0.01       Consults:     IP CONSULT TO HOSPITALIST  IP CONSULT TO PHARMACY  IP CONSULT TO PULMONOLOGY  IP CONSULT TO CARDIOLOGY    Active Hospital Problems    Diagnosis Date Noted    Acute on chronic respiratory failure with hypoxia (HCC) [J96.21]      Priority: Medium    Pneumonia [J18.9] 06/27/2022     Priority: Medium         ASSESSMENT AND PLAN      Pneumonia, likely gram-positive, community-acquired:  Rapid flu ruled out. COVID-19 negative.  procal mildly elevated 0.60  blood cultures, urine Legionella and streptococcal antigen- pending  respiratory culture pending  cont with ceftriaxone azithromycin  Pulmonology following      COPD with chronic hypoxic respiratory failure:  Normally on oxygen at home at 3 L  Does not appear to be in exacerbation now but is at risk given pneumonia  Continue home inhalers     Tachycardia: This is consistent with sepsis and PNA. CTA ruled out PE. Given gentle IVF. Hold further fluids now. Telemetry monitoring.      Chronic diastolic CHF:  Patient is due for echocardiogram as outpatient, however cardiology recommends to hold off given acute setting and tachycardia. Patient would like to see if she can get it before she leaves if HR improves. Cardiology following  On metoprolol    DVT Prophylaxis: lovenox  Diet: ADULT DIET;  Regular; Low Fat/Low Chol/High Fiber/2 gm Na  Code Status: Full Code    PT/OT Eval Status:ordered    Dispo - inpt, likely 1-2 days for IV antibiotics    Guzman Gomes MD

## 2022-06-29 NOTE — PROGRESS NOTES
Patient assessed and vitals, morning meds given. Epic populated a suggestion to call a RAPID response due to vital signs. Patient is alert and oriented x4, talking to RN, texting on her cellphone. Patient in no acute distress. Pt. Has been tachycardic since admission. RN notified Dr. Mercy Alford and is awaiting further instruction. RN also notified Charge RN.     -------  9845  Dr. Mercy Alford does not want rapid response, says she will come to bedside.

## 2022-06-29 NOTE — PROGRESS NOTES
Patient states that she does not understand what Dr. Sakina Ace just went over with her, requested to have Dr. Sakina Ace call her son. This message was relayed to Dr. Sakina Ace by RN.

## 2022-06-29 NOTE — PROGRESS NOTES
Occupational Therapy  Facility/Department: [address]  Occupational Therapy Initial Assessment, Treatment, D/c note    Name: Young Freitas  : 1945  MRN: [id number]  Date of Service: 2022    Discharge Recommendations: Young Freitas scored a 23/24 on the AM-PAC ADL Inpatient form. At this time, no further OT is recommended upon discharge. Recommend patient returns to prior setting with prior services. OT Equipment Recommendations  Equipment Needed: No       Patient Diagnosis(es): The encounter diagnosis was Pneumonia of left upper lobe due to infectious organism. Past Medical History:  has a past medical history of Age-related osteoporosis without current pathological fracture, Diastolic CHF ([address] Other emphysema ([address] Primary hypertension, Pure hypercholesterolemia, and PVD (peripheral vascular disease) ([facility] Past Surgical History:  has a past surgical history that includes PERIPHERAL PERCUTANEOUS ARTERIAL INTERVENTION (Right, 2010); Hip fracture surgery (Left); and Ankle fracture surgery (Left). Assessment   Assessment: Pt from home with son. Independent in ADLs and receives help for IADLs from son. Pt presented at baseline during eval. No further OT needed. Will sign off from OT services. Pt plans for home at d/c. Prognosis: Good  Decision Making: Low Complexity  No Skilled OT: Independent with ADL's;At baseline function; Safe to return home  REQUIRES OT FOLLOW-UP: No  Activity Tolerance  Activity Tolerance: Patient Tolerated treatment well  Activity Tolerance Comments: Pt on 5L O2, slight SOB with activity resolves with rest.        Plan d/c acute OT        Restrictions  Position Activity Restriction  Other position/activity restrictions: Up as tolerated    Subjective   General  Chart Reviewed:  Yes  Additional Pertinent Hx: Admit  with PNA CXray- Increased left-sided pulmonary airspace opacity, CTPA - neg PE, Extensive airspace disease in the left upper lobe and lingula with trace left-sided effusion. PMHX: hypertension, severe COPD, chronic hypoxemic respiratory failure  Family / Caregiver Present: No  Referring Practitioner: Kaylynn Marcelo MD  Diagnosis: Pneumonia  Subjective  Subjective: \" I feel better today\" Pt found toileting on BSC. Pt agreeable for OT eval and tx. Social/Functional History  Social/Functional History  Lives With: Son (daughter-in-law)  Type of Home: House  Home Layout: Able to Live on Main level with bedroom/bathroom  Home Access: Stairs to enter with rails (1 step)  Bathroom Shower/Tub: Walk-in shower  Bathroom Toilet: Standard (vanity for leverage)  Bathroom Equipment: Hand-held shower,Grab bars in shower,Shower chair  Home Equipment: Oxygen,Cane,Walker, rolling,Rollator (2L Home O2, transport chair)  Has the patient had two or more falls in the past year or any fall with injury in the past year?: No  ADL Assistance: Independent  Homemaking Assistance: Needs assistance (family does [de-identified] of homemaking- pt can do light duties at home)  Ambulation Assistance: Independent  Transfer Assistance: Independent  Active : No (son transports)  Mode of Transportation: Family  Occupation: Retired ()  Additional Comments: Son is a pediatrician. Pt reports she has been feeling weak/fatigued for about 2 weeks. Objective  Treatment included functional transfer training, ADL's and pt. education. Safety Devices  Type of Devices: Call light within reach;Nurse notified; Chair alarm in place; Left in chair     Toilet Transfers  Toilet - Technique: Ambulating  Equipment Used: Standard toilet  Toilet Transfer: Modified independent  Toilet Transfers Comments: with grabbar. Pt has similar setup at home. Shower Transfers  Shower Transfers Comments: Pt reports uses shower chair and has supervision with bathing. AROM: Within functional limits  PROM: Within functional limits  Strength:  Within functional limits  Coordination: Within functional limits  Tone: Normal  Sensation: Intact  ADL  Feeding: Independent  Grooming: Independent  Grooming Skilled Clinical Factors: Stands at sink for handwashing  UE Dressing: Independent  LE Dressing: Independent  LE Dressing Skilled Clinical Factors: Pt donned / doffed socks. Pt edu on sitting for safety and verbalized understanding. Toileting: Setup  Toileting Skilled Clinical Factors: Pt able to perform pericare and clothing management on BSC. Transfers  Sit to stand: Independent  Stand to sit:  Independent  Vision - Basic Assessment  Prior Vision: Wears glasses all the time  Cognition  Overall Cognitive Status: WNL     Education Given To: Patient  Education Provided: IADL Safety  Education Method: Verbal  Barriers to Learning: None  Education Outcome: Verbalized understanding       AM-PAC Score  AM-PAC Inpatient Daily Activity Raw Score: 23 (06/29/22 1204)  AM-PAC Inpatient ADL T-Scale Score : 51.12 (06/29/22 1204)  ADL Inpatient CMS 0-100% Score: 15.86 (06/29/22 1204)  ADL Inpatient CMS G-Code Modifier : CI (06/29/22 1204)    Goals  Patient Goals   Patient goals : Go home ASAP       Therapy Time   Individual Concurrent Group Co-treatment   Time In 1040         Time Out 1120         Minutes 40             Timed Code Treatment Minutes:   23 mins    Total Treatment Minutes:  40 mins     ABBEY Mcnamara OT

## 2022-06-29 NOTE — PROGRESS NOTES
Pulmonary Followup Note    CC: Pneumonia, acute on chronic hypoxemic respiratory failure. Subjective:  Currently on 5 L of oxygen but did not have any acute events overnight. She still feels more tired than usual but is feeling much better than when she came in. ROS:  Denies headache, nausea or chest pain. 24HR INTAKE/OUTPUT:    Intake/Output Summary (Last 24 hours) at 2022 1249  Last data filed at 2022 0231  Gross per 24 hour   Intake 240 ml   Output 300 ml   Net -60 ml        metoprolol tartrate  25 mg Oral BID    guaiFENesin  600 mg Oral BID    Arformoterol Tartrate  15 mcg Nebulization BID    ipratropium-albuterol  1 ampule Inhalation TID    sodium chloride flush  5-40 mL IntraVENous 2 times per day    enoxaparin  40 mg SubCUTAneous Daily    cefTRIAXone (ROCEPHIN) IV  1,000 mg IntraVENous Q24H    azithromycin  500 mg IntraVENous Q24H    aspirin  81 mg Oral Daily    magnesium oxide  400 mg Oral Daily    atorvastatin  40 mg Oral Nightly           PHYSICAL EXAMINATION:  /64   Pulse (!) 106   Temp 98.5 °F (36.9 °C) (Oral)   Resp 19   Ht 5' (1.524 m)   Wt 110 lb 9.6 oz (50.2 kg)   SpO2 94%   BMI 21.60 kg/m²   CURRENT PULSE OXIMETRY:  SpO2: 94 %  24HR PULSE OXIMETRY RANGE:  SpO2  Av.7 %  Min: 89 %  Max: 96 % on 5      Gen: Mild distress. Speaking in full sentences with trace accessory muscle use  HEENT: PERRL, EOMI, OP nl  Lung: Inspiratory crackles on the left otherwise clear. No wheezing. CV: RRR without M/R/R  Abd: +BS, soft, NT/ND  Ext: No edema.     DATA  CBC:   Recent Labs     22  1103 22  0827 22  0831   WBC 22.4* 18.3* 16.6*   HGB 10.3* 10.6* 10.9*   HCT 31.6* 32.1* 33.6*   MCV 91.1 90.5 92.0    292 317     BMP:   Recent Labs     22  1104 22  1402 22  0827 22  0831   *  --  137 134*   K 5.7* 5.6* 4.4 5.2*   CL 92*  --  95* 97*   CO2 31  --  29 24   BUN 34*  --  28* 23* CREATININE 0.7  --  0.7 0.6     No results for input(s): PHART, XEN6EFN, PO2ART in the last 72 hours. LIVER PROFILE:   Recent Labs     06/27/22  1104   AST 51*   ALT 60*   LIPASE 21.0   BILIDIR <0.2   BILITOT 0.7   ALKPHOS 100       CXR REVIEWED BY ME AND SHOWED:  XR CHEST PORTABLE   Final Result   1. No interval change in extensive left upper lobe lobar pneumonia   2. Central lobar emphysema      CT CHEST PULMONARY EMBOLISM W CONTRAST   Final Result      No evidence of pulmonary embolus. Extensive airspace disease in the left upper lobe and lingula with trace left-sided effusion. Findings consistent with pulmonary arterial hypertension. Extensive coronary artery calcification noted. Nodules described in the right lung are stable when compared to the previous examination that the nodules in the left lung are scattered by airspace disease. Follow-up after clearing of infiltrate is recommended with CT of the chest to ensure nodule    stability. XR CHEST (2 VW)   Final Result   Impression: Increased left-sided pulmonary airspace opacity, as above. ASSESSMENT/PLAN:  This is a 78 y.o. female with acute on chronic hypoxemic respiratory failure secondary to left-sided pneumonia. Subjectively patient is feeling improved relative to when she came in. This is suggestive that the coverage for community-acquired pneumonia is helping. We are still awaiting the results of a respiratory culture. Patient is already on oxygen at home and uses upwards of 3 L with exertion she is moving in the right direction, dispositioning her home on supplemental oxygen will be easier. She is going to be on oxygen for the rest of her life regardless. Continue antibiotics and her bronchodilator regimen but there is no indication for steroids at this time.     I had been in the process of working her up for endobronchial valve therapy but that plan will have to be delayed until her chest x-ray can clear. No changes today from my perspective.       Shani Sanchez MD

## 2022-06-30 LAB
ANION GAP SERPL CALCULATED.3IONS-SCNC: 12 MMOL/L (ref 3–16)
BASOPHILS ABSOLUTE: 0 K/UL (ref 0–0.2)
BASOPHILS RELATIVE PERCENT: 0 %
BUN BLDV-MCNC: 20 MG/DL (ref 7–20)
CALCIUM SERPL-MCNC: 8.4 MG/DL (ref 8.3–10.6)
CHLORIDE BLD-SCNC: 94 MMOL/L (ref 99–110)
CO2: 29 MMOL/L (ref 21–32)
CREAT SERPL-MCNC: 0.6 MG/DL (ref 0.6–1.2)
EOSINOPHILS ABSOLUTE: 0.5 K/UL (ref 0–0.6)
EOSINOPHILS RELATIVE PERCENT: 3 %
GFR AFRICAN AMERICAN: >60
GFR NON-AFRICAN AMERICAN: >60
GLUCOSE BLD-MCNC: 160 MG/DL (ref 70–99)
HCT VFR BLD CALC: 32.8 % (ref 36–48)
HEMOGLOBIN: 10.6 G/DL (ref 12–16)
LYMPHOCYTES ABSOLUTE: 0.5 K/UL (ref 1–5.1)
LYMPHOCYTES RELATIVE PERCENT: 3 %
MCH RBC QN AUTO: 29.6 PG (ref 26–34)
MCHC RBC AUTO-ENTMCNC: 32.3 G/DL (ref 31–36)
MCV RBC AUTO: 91.6 FL (ref 80–100)
MONOCYTES ABSOLUTE: 0.7 K/UL (ref 0–1.3)
MONOCYTES RELATIVE PERCENT: 4 %
NEUTROPHILS ABSOLUTE: 15.6 K/UL (ref 1.7–7.7)
NEUTROPHILS RELATIVE PERCENT: 90 %
OVALOCYTES: ABNORMAL
PDW BLD-RTO: 13.9 % (ref 12.4–15.4)
PLATELET # BLD: 330 K/UL (ref 135–450)
PMV BLD AUTO: 7.4 FL (ref 5–10.5)
POTASSIUM REFLEX MAGNESIUM: 3.9 MMOL/L (ref 3.5–5.1)
RBC # BLD: 3.58 M/UL (ref 4–5.2)
SODIUM BLD-SCNC: 135 MMOL/L (ref 136–145)
STREP PNEUMONIAE ANTIGEN, URINE: NORMAL
WBC # BLD: 17.3 K/UL (ref 4–11)

## 2022-06-30 PROCEDURE — 87070 CULTURE OTHR SPECIMN AEROBIC: CPT

## 2022-06-30 PROCEDURE — 85025 COMPLETE CBC W/AUTO DIFF WBC: CPT

## 2022-06-30 PROCEDURE — 94640 AIRWAY INHALATION TREATMENT: CPT

## 2022-06-30 PROCEDURE — 6370000000 HC RX 637 (ALT 250 FOR IP): Performed by: INTERNAL MEDICINE

## 2022-06-30 PROCEDURE — 6360000002 HC RX W HCPCS: Performed by: INTERNAL MEDICINE

## 2022-06-30 PROCEDURE — 94761 N-INVAS EAR/PLS OXIMETRY MLT: CPT

## 2022-06-30 PROCEDURE — 1200000000 HC SEMI PRIVATE

## 2022-06-30 PROCEDURE — 97530 THERAPEUTIC ACTIVITIES: CPT

## 2022-06-30 PROCEDURE — 36415 COLL VENOUS BLD VENIPUNCTURE: CPT

## 2022-06-30 PROCEDURE — 97116 GAIT TRAINING THERAPY: CPT

## 2022-06-30 PROCEDURE — 2580000003 HC RX 258: Performed by: INTERNAL MEDICINE

## 2022-06-30 PROCEDURE — 80048 BASIC METABOLIC PNL TOTAL CA: CPT

## 2022-06-30 PROCEDURE — 94669 MECHANICAL CHEST WALL OSCILL: CPT

## 2022-06-30 PROCEDURE — 99232 SBSQ HOSP IP/OBS MODERATE 35: CPT | Performed by: INTERNAL MEDICINE

## 2022-06-30 PROCEDURE — 87205 SMEAR GRAM STAIN: CPT

## 2022-06-30 PROCEDURE — 2700000000 HC OXYGEN THERAPY PER DAY

## 2022-06-30 RX ORDER — FUROSEMIDE 20 MG/1
20 TABLET ORAL DAILY
Status: DISCONTINUED | OUTPATIENT
Start: 2022-06-30 | End: 2022-06-30

## 2022-06-30 RX ORDER — FUROSEMIDE 10 MG/ML
20 INJECTION INTRAMUSCULAR; INTRAVENOUS 3 TIMES DAILY
Status: DISCONTINUED | OUTPATIENT
Start: 2022-06-30 | End: 2022-07-01

## 2022-06-30 RX ORDER — SPIRONOLACTONE 25 MG/1
25 TABLET ORAL DAILY
Status: DISCONTINUED | OUTPATIENT
Start: 2022-06-30 | End: 2022-07-01 | Stop reason: HOSPADM

## 2022-06-30 RX ADMIN — CEFTRIAXONE 1000 MG: 1 INJECTION, POWDER, FOR SOLUTION INTRAMUSCULAR; INTRAVENOUS at 15:57

## 2022-06-30 RX ADMIN — IPRATROPIUM BROMIDE AND ALBUTEROL SULFATE 1 AMPULE: 2.5; .5 SOLUTION RESPIRATORY (INHALATION) at 09:27

## 2022-06-30 RX ADMIN — ATORVASTATIN CALCIUM 40 MG: 40 TABLET, FILM COATED ORAL at 20:58

## 2022-06-30 RX ADMIN — IPRATROPIUM BROMIDE AND ALBUTEROL SULFATE 1 AMPULE: 2.5; .5 SOLUTION RESPIRATORY (INHALATION) at 21:27

## 2022-06-30 RX ADMIN — ARFORMOTEROL TARTRATE 15 MCG: 15 SOLUTION RESPIRATORY (INHALATION) at 09:27

## 2022-06-30 RX ADMIN — SPIRONOLACTONE 25 MG: 25 TABLET, FILM COATED ORAL at 11:51

## 2022-06-30 RX ADMIN — GUAIFENESIN 600 MG: 600 TABLET, EXTENDED RELEASE ORAL at 08:54

## 2022-06-30 RX ADMIN — Medication 400 MG: at 08:54

## 2022-06-30 RX ADMIN — GUAIFENESIN 600 MG: 600 TABLET, EXTENDED RELEASE ORAL at 20:58

## 2022-06-30 RX ADMIN — METOPROLOL TARTRATE 25 MG: 25 TABLET, FILM COATED ORAL at 08:54

## 2022-06-30 RX ADMIN — ENOXAPARIN SODIUM 40 MG: 100 INJECTION SUBCUTANEOUS at 17:14

## 2022-06-30 RX ADMIN — FUROSEMIDE 20 MG: 10 INJECTION INTRAMUSCULAR; INTRAVENOUS at 17:14

## 2022-06-30 RX ADMIN — SODIUM CHLORIDE, PRESERVATIVE FREE 10 ML: 5 INJECTION INTRAVENOUS at 08:55

## 2022-06-30 RX ADMIN — AZITHROMYCIN DIHYDRATE 500 MG: 500 INJECTION, POWDER, LYOPHILIZED, FOR SOLUTION INTRAVENOUS at 17:10

## 2022-06-30 RX ADMIN — IPRATROPIUM BROMIDE AND ALBUTEROL SULFATE 1 AMPULE: 2.5; .5 SOLUTION RESPIRATORY (INHALATION) at 15:50

## 2022-06-30 RX ADMIN — FUROSEMIDE 20 MG: 20 TABLET ORAL at 11:46

## 2022-06-30 RX ADMIN — SODIUM CHLORIDE, PRESERVATIVE FREE 10 ML: 5 INJECTION INTRAVENOUS at 20:59

## 2022-06-30 RX ADMIN — ASPIRIN 81 MG: 81 TABLET, COATED ORAL at 08:54

## 2022-06-30 ASSESSMENT — PAIN SCALES - GENERAL
PAINLEVEL_OUTOF10: 0

## 2022-06-30 NOTE — PROGRESS NOTES
Cardiology Consult Service  Daily Progress Note        Admit Date:  6/27/2022  Primary cardiologist: Dr Yael Cornelius    Reason for Consultation/Chief Complaint: PNA    Subjective:     Patient is a 44-year-old woman with history of PAD, PAD, HTN, severe COPD on 3 L of supplemental oxygen, HFpEF, PFO, pulmonary hypertension.     Echo 12/2021: Normal LV, LVEF greater than 60%, normal RV, indeterminate diastolic function, mild AI, moderate TR, RVSP 69 (3) negative bubble study.     Patient presented to the emergency room on 6/27 with decreased appetite, fatigue, dyspnea over the last 2 weeks. CTPA was negative for PE, revealed left lower lobe consolidation suggestive of pneumonia. On personal review of images, there were no pleural effusions bilaterally, no evidence of pulmonary edema or congestion. WBC was elevated 22 with neutrophilic predominance. She was admitted for community-acquired pneumonia. Cardiology was consulted in view of her history of heart failure. She is being ruled out for COVID. COVID negative    Home diuretics: Lasix 20 mg daily and spironolactone 25 mg daily. Interval history:  Patient with slightly improved oxygen requirements down to 3.5 L from 5 L. I's and O's -800 with 3 unmeasured urine output. Creatinine 2.6 stable, K3.9.     Objective:     Medications:   spironolactone  25 mg Oral Daily    furosemide  20 mg IntraVENous TID    metoprolol tartrate  25 mg Oral BID    guaiFENesin  600 mg Oral BID    Arformoterol Tartrate  15 mcg Nebulization BID    ipratropium-albuterol  1 ampule Inhalation TID    sodium chloride flush  5-40 mL IntraVENous 2 times per day    enoxaparin  40 mg SubCUTAneous Daily    cefTRIAXone (ROCEPHIN) IV  1,000 mg IntraVENous Q24H    azithromycin  500 mg IntraVENous Q24H    aspirin  81 mg Oral Daily    magnesium oxide  400 mg Oral Daily    atorvastatin  40 mg Oral Nightly       IV drips:   sodium chloride         PRN:  sodium chloride flush, sodium chloride, ondansetron **OR** ondansetron, polyethylene glycol, acetaminophen **OR** acetaminophen, perflutren lipid microspheres    Vitals:    06/30/22 0331 06/30/22 0813 06/30/22 0928 06/30/22 1149   BP:  98/61  (!) 95/58   Pulse:  (!) 103 (!) 102 93   Resp:  17 18 17   Temp:  98.4 °F (36.9 °C)  98.2 °F (36.8 °C)   TempSrc:  Oral  Oral   SpO2:  93% 95% 94%   Weight: 109 lb 11.2 oz (49.8 kg)      Height:           Intake/Output Summary (Last 24 hours) at 6/30/2022 1236  Last data filed at 6/30/2022 1000  Gross per 24 hour   Intake 120 ml   Output 1150 ml   Net -1030 ml     I/O last 3 completed shifts: In: 360 [P.O.:360]  Out: 650 [Urine:650]  Wt Readings from Last 3 Encounters:   06/30/22 109 lb 11.2 oz (49.8 kg)   06/01/22 109 lb (49.4 kg)   05/04/22 110 lb (49.9 kg)       Admit Wt: Weight: 115 lb 15.4 oz (52.6 kg)   Todays Wt: Weight: 109 lb 11.2 oz (49.8 kg)    TELEMETRY personally reviewed: Sinus     Physical Exam:         General Appearance:  Alert, cooperative, no distress, appears stated age Appropriate weight   Head:  Normocephalic, without obvious abnormality, atraumatic   Eyes:  PERRL, conjunctiva/corneas clear EOM intact  Ears normal   Throat no lesions       Nose: Nares normal, no drainage or sinus tenderness   Throat: Lips, mucosa, and tongue normal   Neck: Supple, symmetrical, trachea midline, no adenopathy, thyroid: not enlarged, symmetric, no tenderness/mass/nodules, no carotid bruit. Lungs:   Normal respiratory rate, lungs with L ronchi. Chest Wall:  No tenderness or deformity   Heart:  Regular rhythm, rate controlled, S1, S2 normal, there is no murmur, there is no rub or gallop, cannot assess jvd, no bilateral lower extremity edema   Abdomen:   Soft, non-tender, bowel sounds active all four quadrants,  no masses, no organomegaly       Extremities: Extremities normal, atraumatic, no cyanosis.     Pulses: 2+ and symmetric   Skin: Skin color, texture, turgor normal, no rashes or lesions Pysch: Normal mood and affect   Neurologic: Normal gross motor and sensory exam.  Cranial nerves intact       Labs:   Recent Labs     06/27/22  1402 06/28/22  0827 06/29/22  0831 06/30/22  1021   NA  --  137 134*  --    K 5.6* 4.4 5.2*  --    BUN  --  28* 23*  --    CREATININE  --  0.7 0.6 0.6   CL  --  95* 97*  --    CO2  --  29 24  --    GLUCOSE  --  114* 87  --    CALCIUM  --  8.1* 8.1* 8.4   MG  --   --  2.10  --      Recent Labs     06/28/22  0827 06/28/22  0827 06/29/22  0831 06/29/22  0831 06/30/22  1021   WBC 18.3*  --  16.6*  --  17.3*   HGB 10.6*  --  10.9*  --  10.6*   HCT 32.1*  --  33.6*  --  32.8*     --  317  --  330   MCV 90.5   < > 92.0   < > 91.6    < > = values in this interval not displayed. No results for input(s): CHOLTOT, TRIG, HDL, CHOLHDL, LDL in the last 72 hours. Invalid input(s): [place]  No results for input(s): PTT, INR in the last 72 hours. Invalid input(s): PT  No results for input(s): CKTOTAL, CKMB, CKMBINDEX, TROPONINI in the last 72 hours. No results for input(s): BNP in the last 72 hours. No results for input(s): NTPROBNP in the last 72 hours. No results for input(s): TSH in the last 72 hours. Imaging:       Assessment & Plan:     1. Dyspnea. It is most likely due to community-acquired pneumonia. There is no evidence of acute/decompensated diastolic heart failure on admission, though I cannot exclude mild superimposed edema today. Patient is currently on IV antibiotics per primary team.  2.  Chronic HFpEF. It is well compensated. 3.  HTN. BP well controlled. 4.  Severe COPD on 3 L of supplemental oxygen at baseline  5. PAT.            - We will stop home dose lisinopril 5 mg daily and Imdur 30 mg daily to avoid hypotension  - Give lasix 20 mg iv q 8 hours, start tati 25 daily  - Cw Lopressor to 25 mg twice daily.  - Strict I's and O's every shift and standing weights if possible, low-salt diet, daily BMP with reflex to Mg (correct lytes for goals K >4.0 and Mg > 2.0) and wean supplemental oxygen to off (or down to baseline supplemental oxygen requirements) for sats greater than 92-94%.  -No need to repeat echo.          I have spent 35 minutes of face to face time with the patient with more than 50% spent counseling and coordinating care. I have personally reviewed the reports and images of labs, radiological studies, cardiac studies including ECG's and telemetry, current and old medical records. The note was completed using EMR and Dragon dictation system. Every effort was made to ensure accuracy; however, inadvertent computerized transcription errors may be present. All questions and concerns were addressed to the patient/family. Alternatives to my treatment were discussed. Thank you for allowing to us to participate in the care or Karl Paredes. Please call our service with questions.     Brannon Sears MD, [facility] [place], [address]  The [address]  [address] [place] [address] [place] [address]  Ph: [phone]  Fax: [phone]

## 2022-06-30 NOTE — CARE COORDINATION
CM  Met  With  Pt  At  Bedside  And  Still planning to d/c home  W/  Family  <  States  DIL can  Transport. [facility]  Following  For  C       CM will cont to follow  . Electronically signed by Albino Hodge RN on 6/30/2022 at 5:33 PM         Albino Hodge RN Case Manager  [facility]  [address] [place] [address].   [facility] [address] [phone]  Fax [phone]

## 2022-06-30 NOTE — PROGRESS NOTES
appearance: chronically ill appearing, No acute distress, appears stated age and cooperative. Lungs: diminished clear on the right with coarse rhonchi on the left  Heart: Regular rate and rhythm with Normal S1/S2 without  murmurs, rubs or gallops, point of maximum impulse non-displaced  Abdomen: Soft, non-tender or non-distended without rigidity or guarding and positive bowel sounds all four quadrants. Extremities: No clubbing, cyanosis, or edema bilaterally. Skin: Skin color, texture, turgor normal.    Neurologic: Alert and oriented X 3,   grossly non-focal.  Mental status: Alert, oriented, thought content appropriate. Data    Recent Labs     06/27/22  1103 06/28/22  0827 06/29/22  0831   WBC 22.4* 18.3* 16.6*   HGB 10.3* 10.6* 10.9*   HCT 31.6* 32.1* 33.6*    292 317      Recent Labs     06/27/22  1104 06/27/22  1402 06/28/22  0827 06/29/22  0831   *  --  137 134*   K 5.7* 5.6* 4.4 5.2*   CL 92*  --  95* 97*   CO2 31  --  29 24   BUN 34*  --  28* 23*   CREATININE 0.7  --  0.7 0.6     Recent Labs     06/27/22  1104   AST 51*   ALT 60*   BILIDIR <0.2   BILITOT 0.7   ALKPHOS 100     No results for input(s): INR in the last 72 hours. Recent Labs     06/27/22  1104   TROPONINI <0.01       Consults:     IP CONSULT TO HOSPITALIST  IP CONSULT TO PHARMACY  IP CONSULT TO PULMONOLOGY  IP CONSULT TO CARDIOLOGY    Active Hospital Problems    Diagnosis Date Noted    Chronic heart failure with preserved ejection fraction (HFpEF) ([facility]) [I50.32]      Priority: Medium    Acute on chronic respiratory failure with hypoxia ([facility]) [J96.21]      Priority: Medium    Pneumonia [J18.9] 06/27/2022     Priority: Medium         ASSESSMENT AND PLAN      Pneumonia, likely gram-positive, community-acquired:  Rapid flu ruled out. COVID-19 negative.  procal mildly elevated 0.60  blood cultures, urine Legionella and streptococcal antigen- pending  respiratory culture pending  cont with ceftriaxone azithromycin  Pulmonology following      COPD with chronic hypoxic respiratory failure:  Normally on oxygen at home at 3 L  Does not appear to be in exacerbation now but is at risk given pneumonia  Continue home inhalers     Tachycardia: This is consistent with sepsis and PNA. CTA ruled out PE. Given gentle IVF. Hold further fluids now. Telemetry monitoring.      Chronic diastolic CHF:  Patient is due for echocardiogram as outpatient, however cardiology recommends to hold off given acute setting and tachycardia. Patient would like to see if she can get it before she leaves if HR improves. Cardiology following  On metoprolol    DVT Prophylaxis: lovenox  Diet: ADULT DIET;  Regular; Low Fat/Low Chol/High Fiber/2 gm Na  Code Status: Full Code    PT/OT Eval Status:ordered    Dispo - inpt, likely 1-2 days for IV antibiotics, stable O2 requirement    Filippo Benjamin DO

## 2022-06-30 NOTE — PROGRESS NOTES
Pulmonary Followup Note    CC: Pneumonia, acute on chronic hypoxemic respiratory failure. Subjective: Weaned to 3.5L as of this morning and is feeling closer to baseline. Still with congested cough. ROS:  Denies headache, nausea or chest pain. 24HR INTAKE/OUTPUT:      Intake/Output Summary (Last 24 hours) at 2022 1212  Last data filed at 2022 1000  Gross per 24 hour   Intake 120 ml   Output 1150 ml   Net -1030 ml        spironolactone  25 mg Oral Daily    furosemide  20 mg IntraVENous TID    metoprolol tartrate  25 mg Oral BID    guaiFENesin  600 mg Oral BID    Arformoterol Tartrate  15 mcg Nebulization BID    ipratropium-albuterol  1 ampule Inhalation TID    sodium chloride flush  5-40 mL IntraVENous 2 times per day    enoxaparin  40 mg SubCUTAneous Daily    cefTRIAXone (ROCEPHIN) IV  1,000 mg IntraVENous Q24H    azithromycin  500 mg IntraVENous Q24H    aspirin  81 mg Oral Daily    magnesium oxide  400 mg Oral Daily    atorvastatin  40 mg Oral Nightly           PHYSICAL EXAMINATION:  BP (!) 95/58   Pulse 93   Temp 98.2 °F (36.8 °C) (Oral)   Resp 17   Ht 5' (1.524 m)   Wt 109 lb 11.2 oz (49.8 kg)   SpO2 94%   BMI 21.42 kg/m²   CURRENT PULSE OXIMETRY:  SpO2: 94 %  24HR PULSE OXIMETRY RANGE:  SpO2  Av.7 %  Min: 92 %  Max: 99 % on 5      Gen: Mild distress. Speaking in full sentences with trace accessory muscle use  HEENT: PERRL, EOMI, OP nl  Lung: Inspiratory crackles on the left otherwise clear. No wheezing. CV: RRR without M/R/R  Abd: +BS, soft, NT/ND  Ext: No edema.     DATA  CBC:   Recent Labs     22  0827 22  0831 22  1021   WBC 18.3* 16.6* 17.3*   HGB 10.6* 10.9* 10.6*   HCT 32.1* 33.6* 32.8*   MCV 90.5 92.0 91.6    317 330     BMP:   Recent Labs     22  1402 22  0827 22  0831 22  1021   NA  --  137 134*  --    K 5.6* 4.4 5.2*  --    CL  --  95* 97*  --    CO2  --    --    BUN --  28* 23*  --    CREATININE  --  0.7 0.6 0.6     No results for input(s): PHART, LOL3GOI, PO2ART in the last 72 hours. LIVER PROFILE:   No results for input(s): AST, ALT, LIPASE, BILIDIR, BILITOT, ALKPHOS in the last 72 hours. Invalid input(s): AMYLASE,  ALB    CXR REVIEWED BY ME AND SHOWED:  XR CHEST PORTABLE   Final Result   1. No interval change in extensive left upper lobe lobar pneumonia   2. Central lobar emphysema      CT CHEST PULMONARY EMBOLISM W CONTRAST   Final Result      No evidence of pulmonary embolus. Extensive airspace disease in the left upper lobe and lingula with trace left-sided effusion. Findings consistent with pulmonary arterial hypertension. Extensive coronary artery calcification noted. Nodules described in the right lung are stable when compared to the previous examination that the nodules in the left lung are scattered by airspace disease. Follow-up after clearing of infiltrate is recommended with CT of the chest to ensure nodule    stability. XR CHEST (2 VW)   Final Result   Impression: Increased left-sided pulmonary airspace opacity, as above. ASSESSMENT/PLAN:  This is a 78 y.o. female with acute on chronic hypoxemic respiratory failure secondary to left-sided pneumonia. It doesn't appear that her respiratory cultures were ever sent. Patient is on 3L of oxygen at home so she is close to baseline there. Continue empiric antibiotics and her bronchodilator regimen. Can transition to oral antibiotics and if she continues improving she should be ok to discharge soon.       Jessika Milan MD

## 2022-06-30 NOTE — PROGRESS NOTES
Physical Therapy  Daily Treatment Note    Discharge Recommendations: Pankaj Diamond scored a 19/24 on the AM-PAC short mobility form. Current research shows that an AM-PAC score of 18 or greater is typically associated with a discharge to the patient's home setting. Based on the patient's AM-PAC score and their current functional mobility deficits, it is recommended that the patient have 2-3 sessions per week of Physical Therapy at d/c to increase the patient's independence. Please see assessment section for further patient specific details. Assessment:  Gait weak, but steady without device. Pt prefers to not use a wheeled walker despite discussing energy conservation benefits. Pt with decreased oxygen saturation with activity, but recovered quickly. Pt reports feeling close to baseline. Plan is for return home with family. Would benefit from initial 24 hour assist for safety and continued PT to work towards baseline function. No new DME needs. Equipment Needs: No new needs    Chart Reviewed: Yes     Other position/activity restrictions: Up as tolerated   Additional Pertinent Hx: Pt to ED 6/27 with shortness of breath and fatigue. Pt admitted for PNA. CXR: left-sided pulmonary airspace opacity. Chest CT (-) for PE. Covid-19 test pending. PMH:  PVD, HTN, emphysema, Hip fx      Diagnosis: PNA   Treatment Diagnosis: Decreased gait associated with PNA. Subjective: Pt in bed initially. \"I'm bored. \" Hopes to go home soon. \"They're trying to get my oxygen back down to 3. \" Ready to work with PT. \"I should probably go to the bathroom while I'm up. \"  \"I have walkers at home. I just don't use them. \" (Therapist discussing option of using wheeled walker for energy conservation.)    Pain: Denies    Objective:    Bed mobility  Supine to sit: Supervision, HOB up   Sit to Supine: Supervision, HOB up    Transfers  Sit to stand: SBA from bed; SBA from commode with grab bar  Stand to sit: SBA onto commode with grab bar; SBA onto bed    Ambulation  Assistance Level: CGA initially, progressing to SBA  Assistive device: None  Distance: 35 ft, 12 ft  Quality of gait: Weak; decreased pace; minimal arm swing; mildly flexed posture; no LOB    Vitals  Pt on 3.5L oxygen throughout session. Saturation = 94% prior to getting OOB. Down to 83% after walking in room and trip to/from bathroom. Pt reported min SOB (\"I've been living with this for 11 years, so this is what I'm used to.\") Saturation improved to 92% in ~60 seconds with cues for breathing technique. Patient Education  Role of PT. Calling for assist with needs. Expressed understanding. Safety Devices  Pt left with needs in reach. In bed with bed alarm on. RN updated. AM-PAC score  AM-PAC Inpatient Mobility Raw Score : 19  AM-PAC Inpatient T-Scale Score : 45.44  Mobility Inpatient CMS 0-100% Score: 41.77  Mobility Inpatient CMS G-Code Modifier : CK    Goals: (as determined and assessed by primary PT)  Time Frame for Short term goals: Discharge  Short term goal 1: supine <> sit modified independent   Short term goal 2: sit <> stand supervision   Short term goal 3: ambulate 200ft with/without assistive device supervision    Plan:  Plan:  (2-5x/week)  Current Treatment Recommendations: Transfer training,Functional mobility training,Strengthening,Gait training    Therapy Time    Individual  Concurrent  Group  Co-treatment    Time In  958            Time Out  1023          Minutes  25              Timed Code Treatment Minutes: 25  Total Treatment Minutes: 25    Will continue per plan of care. If patient is discharged prior to next treatment, this note will serve as the discharge summary.     Anastacia, [place] #[id number]

## 2022-07-01 VITALS
WEIGHT: 111.25 LBS | BODY MASS INDEX: 21.84 KG/M2 | SYSTOLIC BLOOD PRESSURE: 120 MMHG | HEART RATE: 94 BPM | RESPIRATION RATE: 17 BRPM | OXYGEN SATURATION: 95 % | DIASTOLIC BLOOD PRESSURE: 66 MMHG | HEIGHT: 60 IN | TEMPERATURE: 97.7 F

## 2022-07-01 LAB
ANION GAP SERPL CALCULATED.3IONS-SCNC: 12 MMOL/L (ref 3–16)
BASOPHILS ABSOLUTE: 0.1 K/UL (ref 0–0.2)
BASOPHILS RELATIVE PERCENT: 0.8 %
BLOOD CULTURE, ROUTINE: NORMAL
BUN BLDV-MCNC: 21 MG/DL (ref 7–20)
CALCIUM SERPL-MCNC: 8.5 MG/DL (ref 8.3–10.6)
CHLORIDE BLD-SCNC: 95 MMOL/L (ref 99–110)
CO2: 30 MMOL/L (ref 21–32)
CREAT SERPL-MCNC: <0.5 MG/DL (ref 0.6–1.2)
CULTURE, BLOOD 2: NORMAL
EOSINOPHILS ABSOLUTE: 0.3 K/UL (ref 0–0.6)
EOSINOPHILS RELATIVE PERCENT: 2.4 %
GFR AFRICAN AMERICAN: >60
GFR NON-AFRICAN AMERICAN: >60
GLUCOSE BLD-MCNC: 83 MG/DL (ref 70–99)
HCT VFR BLD CALC: 29.6 % (ref 36–48)
HEMOGLOBIN: 9.8 G/DL (ref 12–16)
LYMPHOCYTES ABSOLUTE: 0.8 K/UL (ref 1–5.1)
LYMPHOCYTES RELATIVE PERCENT: 5.3 %
MAGNESIUM: 1.6 MG/DL (ref 1.8–2.4)
MCH RBC QN AUTO: 30.3 PG (ref 26–34)
MCHC RBC AUTO-ENTMCNC: 33.3 G/DL (ref 31–36)
MCV RBC AUTO: 91.2 FL (ref 80–100)
MONOCYTES ABSOLUTE: 1 K/UL (ref 0–1.3)
MONOCYTES RELATIVE PERCENT: 7.2 %
NEUTROPHILS ABSOLUTE: 12.3 K/UL (ref 1.7–7.7)
NEUTROPHILS RELATIVE PERCENT: 84.3 %
PDW BLD-RTO: 13.7 % (ref 12.4–15.4)
PLATELET # BLD: 298 K/UL (ref 135–450)
PMV BLD AUTO: 7.9 FL (ref 5–10.5)
POTASSIUM SERPL-SCNC: 4.1 MMOL/L (ref 3.5–5.1)
RBC # BLD: 3.24 M/UL (ref 4–5.2)
SODIUM BLD-SCNC: 137 MMOL/L (ref 136–145)
WBC # BLD: 14.6 K/UL (ref 4–11)

## 2022-07-01 PROCEDURE — 6360000002 HC RX W HCPCS: Performed by: INTERNAL MEDICINE

## 2022-07-01 PROCEDURE — 94640 AIRWAY INHALATION TREATMENT: CPT

## 2022-07-01 PROCEDURE — 6370000000 HC RX 637 (ALT 250 FOR IP): Performed by: INTERNAL MEDICINE

## 2022-07-01 PROCEDURE — 94761 N-INVAS EAR/PLS OXIMETRY MLT: CPT

## 2022-07-01 PROCEDURE — 93005 ELECTROCARDIOGRAM TRACING: CPT | Performed by: INTERNAL MEDICINE

## 2022-07-01 PROCEDURE — 2700000000 HC OXYGEN THERAPY PER DAY

## 2022-07-01 PROCEDURE — 36415 COLL VENOUS BLD VENIPUNCTURE: CPT

## 2022-07-01 PROCEDURE — 83735 ASSAY OF MAGNESIUM: CPT

## 2022-07-01 PROCEDURE — 94669 MECHANICAL CHEST WALL OSCILL: CPT

## 2022-07-01 PROCEDURE — 85025 COMPLETE CBC W/AUTO DIFF WBC: CPT

## 2022-07-01 PROCEDURE — 2580000003 HC RX 258: Performed by: INTERNAL MEDICINE

## 2022-07-01 PROCEDURE — 99232 SBSQ HOSP IP/OBS MODERATE 35: CPT | Performed by: INTERNAL MEDICINE

## 2022-07-01 PROCEDURE — 80048 BASIC METABOLIC PNL TOTAL CA: CPT

## 2022-07-01 PROCEDURE — 99233 SBSQ HOSP IP/OBS HIGH 50: CPT | Performed by: INTERNAL MEDICINE

## 2022-07-01 RX ORDER — FUROSEMIDE 10 MG/ML
40 INJECTION INTRAMUSCULAR; INTRAVENOUS 3 TIMES DAILY
Status: DISCONTINUED | OUTPATIENT
Start: 2022-07-01 | End: 2022-07-01

## 2022-07-01 RX ORDER — GUAIFENESIN 600 MG/1
600 TABLET, EXTENDED RELEASE ORAL 2 TIMES DAILY
COMMUNITY
Start: 2022-07-01

## 2022-07-01 RX ORDER — MAGNESIUM SULFATE IN WATER 40 MG/ML
2000 INJECTION, SOLUTION INTRAVENOUS ONCE
Status: COMPLETED | OUTPATIENT
Start: 2022-07-01 | End: 2022-07-01

## 2022-07-01 RX ORDER — FUROSEMIDE 20 MG/1
20 TABLET ORAL DAILY
Status: DISCONTINUED | OUTPATIENT
Start: 2022-07-02 | End: 2022-07-01 | Stop reason: HOSPADM

## 2022-07-01 RX ORDER — AZITHROMYCIN 250 MG/1
250 TABLET, FILM COATED ORAL DAILY
Qty: 1 TABLET | Refills: 0 | Status: SHIPPED | OUTPATIENT
Start: 2022-07-02 | End: 2022-07-03

## 2022-07-01 RX ORDER — AMOXICILLIN AND CLAVULANATE POTASSIUM 875; 125 MG/1; MG/1
1 TABLET, FILM COATED ORAL 2 TIMES DAILY
Qty: 6 TABLET | Refills: 0 | Status: SHIPPED | OUTPATIENT
Start: 2022-07-02 | End: 2022-07-05

## 2022-07-01 RX ORDER — FUROSEMIDE 20 MG/1
20 TABLET ORAL DAILY
Qty: 30 TABLET | Refills: 0 | Status: SHIPPED
Start: 2022-07-01 | End: 2022-07-31

## 2022-07-01 RX ADMIN — SODIUM CHLORIDE, PRESERVATIVE FREE 10 ML: 5 INJECTION INTRAVENOUS at 09:53

## 2022-07-01 RX ADMIN — FUROSEMIDE 20 MG: 10 INJECTION INTRAMUSCULAR; INTRAVENOUS at 09:53

## 2022-07-01 RX ADMIN — SPIRONOLACTONE 25 MG: 25 TABLET, FILM COATED ORAL at 09:52

## 2022-07-01 RX ADMIN — ASPIRIN 81 MG: 81 TABLET, COATED ORAL at 09:52

## 2022-07-01 RX ADMIN — ARFORMOTEROL TARTRATE 15 MCG: 15 SOLUTION RESPIRATORY (INHALATION) at 07:38

## 2022-07-01 RX ADMIN — GUAIFENESIN 600 MG: 600 TABLET, EXTENDED RELEASE ORAL at 09:54

## 2022-07-01 RX ADMIN — IPRATROPIUM BROMIDE AND ALBUTEROL SULFATE 1 AMPULE: 2.5; .5 SOLUTION RESPIRATORY (INHALATION) at 13:47

## 2022-07-01 RX ADMIN — MAGNESIUM SULFATE HEPTAHYDRATE 2000 MG: 2 INJECTION, SOLUTION INTRAVENOUS at 14:10

## 2022-07-01 RX ADMIN — IPRATROPIUM BROMIDE AND ALBUTEROL SULFATE 1 AMPULE: 2.5; .5 SOLUTION RESPIRATORY (INHALATION) at 07:38

## 2022-07-01 RX ADMIN — SODIUM CHLORIDE 5 ML: 9 INJECTION, SOLUTION INTRAVENOUS at 12:57

## 2022-07-01 RX ADMIN — METOPROLOL TARTRATE 25 MG: 25 TABLET, FILM COATED ORAL at 09:52

## 2022-07-01 RX ADMIN — CEFTRIAXONE 1000 MG: 1 INJECTION, POWDER, FOR SOLUTION INTRAMUSCULAR; INTRAVENOUS at 12:58

## 2022-07-01 RX ADMIN — Medication 400 MG: at 09:52

## 2022-07-01 ASSESSMENT — PAIN SCALES - GENERAL
PAINLEVEL_OUTOF10: 0
PAINLEVEL_OUTOF10: 0

## 2022-07-01 NOTE — PROGRESS NOTES
Cardiology Consult Service  Daily Progress Note        Admit Date:  6/27/2022  Primary cardiologist: Dr Annie Berry    Reason for Consultation/Chief Complaint: PNA    Subjective:     Patient is a 63-year-old woman with history of PAD, PAD, HTN, severe COPD on 3 L of supplemental oxygen, HFpEF, PFO, pulmonary hypertension.     Echo 12/2021: Normal LV, LVEF greater than 60%, normal RV, indeterminate diastolic function, mild AI, moderate TR, RVSP 69 (3) negative bubble study.     Patient presented to the emergency room on 6/27 with decreased appetite, fatigue, dyspnea over the last 2 weeks. CTPA was negative for PE, revealed left lower lobe consolidation suggestive of pneumonia. On personal review of images, there were no pleural effusions bilaterally, no evidence of pulmonary edema or congestion. WBC was elevated 22 with neutrophilic predominance. She was admitted for community-acquired pneumonia. Cardiology was consulted in view of her history of heart failure. She is being ruled out for COVID. COVID negative    Home diuretics: Lasix 20 mg daily and spironolactone 25 mg daily. Interval history:  Patient reports no complaints. She remains on her baseline 3 L of supplemental oxygen. I's and O's -1.5 L, weight stable.     Objective:     Medications:   [START ON 7/2/2022] furosemide  20 mg Oral Daily    magnesium sulfate  2,000 mg IntraVENous Once    spironolactone  25 mg Oral Daily    metoprolol tartrate  25 mg Oral BID    guaiFENesin  600 mg Oral BID    Arformoterol Tartrate  15 mcg Nebulization BID    ipratropium-albuterol  1 ampule Inhalation TID    sodium chloride flush  5-40 mL IntraVENous 2 times per day    enoxaparin  40 mg SubCUTAneous Daily    cefTRIAXone (ROCEPHIN) IV  1,000 mg IntraVENous Q24H    azithromycin  500 mg IntraVENous Q24H    aspirin  81 mg Oral Daily    magnesium oxide  400 mg Oral Daily    atorvastatin  40 mg Oral Nightly       IV drips:   sodium chloride 5 mL (07/01/22 1257)       PRN:  sodium chloride flush, sodium chloride, ondansetron **OR** ondansetron, polyethylene glycol, acetaminophen **OR** acetaminophen, perflutren lipid microspheres    Vitals:    07/01/22 0741 07/01/22 0754 07/01/22 1018 07/01/22 1241   BP:  116/67  120/66   Pulse:  96  92   Resp:  17  17   Temp:  97.9 °F (36.6 °C)  97.7 °F (36.5 °C)   TempSrc:  Oral  Oral   SpO2: 95% 90%  95%   Weight:   111 lb 4 oz (50.5 kg)    Height:           Intake/Output Summary (Last 24 hours) at 7/1/2022 1312  Last data filed at 7/1/2022 1245  Gross per 24 hour   Intake 1110 ml   Output 2175 ml   Net -1065 ml     I/O last 3 completed shifts: In: 56 [P.O.:630]  Out: 2300 [Urine:2000; Stool:300]  Wt Readings from Last 3 Encounters:   07/01/22 111 lb 4 oz (50.5 kg)   06/01/22 109 lb (49.4 kg)   05/04/22 110 lb (49.9 kg)       Admit Wt: Weight: 115 lb 15.4 oz (52.6 kg)   Todays Wt: Weight: 111 lb 4 oz (50.5 kg)    TELEMETRY personally reviewed: Sinus     Physical Exam:         General Appearance:  Alert, cooperative, no distress, appears stated age Appropriate weight   Head:  Normocephalic, without obvious abnormality, atraumatic   Eyes:  PERRL, conjunctiva/corneas clear EOM intact  Ears normal   Throat no lesions       Nose: Nares normal, no drainage or sinus tenderness   Throat: Lips, mucosa, and tongue normal   Neck: Supple, symmetrical, trachea midline, no adenopathy, thyroid: not enlarged, symmetric, no tenderness/mass/nodules, no carotid bruit. Lungs:   Normal respiratory rate, lungs with L ronchi. Chest Wall:  No tenderness or deformity   Heart:  Regular rhythm, rate controlled, S1, S2 normal, there is no murmur, there is no rub or gallop, cannot assess jvd, no bilateral lower extremity edema   Abdomen:   Soft, non-tender, bowel sounds active all four quadrants,  no masses, no organomegaly       Extremities: Extremities normal, atraumatic, no cyanosis.     Pulses: 2+ and symmetric   Skin: Skin color, texture, turgor normal, no rashes or lesions   Pysch: Normal mood and affect   Neurologic: Normal gross motor and sensory exam.  Cranial nerves intact       Labs:   Recent Labs     06/29/22  0831 06/30/22  1021 07/01/22  0628   * 135* 137   K 5.2* 3.9 4.1   BUN 23* 20 21*   CREATININE 0.6 0.6 <0.5*   CL 97* 94* 95*   CO2 24 29 30   GLUCOSE 87 160* 83   CALCIUM 8.1* 8.4 8.5   MG 2.10  --  1.60*     Recent Labs     06/29/22  0831 06/29/22  0831 06/30/22  1021 06/30/22  1021 07/01/22  0628   WBC 16.6*  --  17.3*  --  14.6*   HGB 10.9*  --  10.6*  --  9.8*   HCT 33.6*  --  32.8*  --  29.6*     --  330  --  298   MCV 92.0   < > 91.6   < > 91.2    < > = values in this interval not displayed. No results for input(s): CHOLTOT, TRIG, HDL, CHOLHDL, LDL in the last 72 hours. Invalid input(s): Russell Mckeon  No results for input(s): PTT, INR in the last 72 hours. Invalid input(s): PT  No results for input(s): CKTOTAL, CKMB, CKMBINDEX, TROPONINI in the last 72 hours. No results for input(s): BNP in the last 72 hours. No results for input(s): NTPROBNP in the last 72 hours. No results for input(s): TSH in the last 72 hours. Imaging:       Assessment & Plan:     1. Dyspnea. It is most likely due to community-acquired pneumonia. There is no evidence of acute/decompensated diastolic heart failure on admission, though I cannot exclude mild superimposed edema today. Patient is currently on IV antibiotics per primary team.  2.  Chronic HFpEF. It is well compensated. 3.  HTN. BP well controlled. 4.  Severe COPD on 3 L of supplemental oxygen at baseline  5. PAT.         Past we will change IV Lasix to Lasix 20 mg p.o. daily and continue spironolactone 25 mg daily  - Cw Lopressor 25 mg twice daily.  - Strict I's and O's every shift and standing weights if possible, low-salt diet, daily BMP with reflex to Mg (correct lytes for goals K >4.0 and Mg > 2.0) and wean supplemental oxygen to off (or down to baseline supplemental oxygen requirements) for sats greater than 92-94%.  -No need to repeat echo. Patient may be discharged home today on the above meds and follow up with me in 1 week with a BMP prior to visit. Please call me with any questions.            I have spent 35 minutes of face to face time with the patient with more than 50% spent counseling and coordinating care. I have personally reviewed the reports and images of labs, radiological studies, cardiac studies including ECG's and telemetry, current and old medical records. The note was completed using EMR and Dragon dictation system. Every effort was made to ensure accuracy; however, inadvertent computerized transcription errors may be present. All questions and concerns were addressed to the patient/family. Alternatives to my treatment were discussed. Thank you for allowing to us to participate in the care or Candace Euceda. Please call our service with questions.     Cash Martin MD, [facility] [place], [address]  The [address]  [address] [place] [address]  Ph: [phone]  Fax: [phone]

## 2022-07-01 NOTE — PROGRESS NOTES
Physician Progress Note      PATIENTDrdelia Shepherd  [facility] #:                  [id number]  :                       1945  ADMIT DATE:       2022 9:46 AM  DISCH DATE:  RESPONDING  PROVIDER #:        TAMARA MCCULLOUGH DO          QUERY TEXT:    Patient admitted with sepsis, pneumonia. Noted documentation of acute on   chronic respiratory failure with hypoxia in PN  and chronic respiratory   failure in PN . If possible, please document in progress notes and discharge summary if you   are evaluating and /or treating any of the following: The medical record reflects the following:  Risk Factors: 79 yo w/ sepsis, pna, hx of CHF, COPD on 3L 02 at baseline. Clinical Indicators: Per PN : COPD with chronic hypoxic respiratory   failure. Per PN : acute on chronic hypoxemic respiratory failure. Per   consult :  increased work of breathing. Mild accessory muscle use. Sats   68%, RR 27. Treatment: up to 5L 02  Options provided:  -- Acute on chronic respiratory failure confirmed and chronic respiratory   failure ruled out  -- Chronic respiratory failure confirmed and acute on chronic respiratory   failure ruled out  -- Other - I will add my own diagnosis  -- Disagree - Not applicable / Not valid  -- Disagree - Clinically unable to determine / Unknown  -- Refer to Clinical Documentation Reviewer    PROVIDER RESPONSE TEXT:    Both acute and chronic respiratory failure.     Query created by: Britt Dakins on 2022 9:22 AM      Electronically signed by:  Yair Wong DO 2022 12:28 PM

## 2022-07-01 NOTE — DISCHARGE SUMMARY
Hospital Medicine Discharge Summary    Patient: Jose Roberto Neli     Gender: female  : 1945   Age: 78 y.o. MRN: [id number]    Admitting Physician: Lina Davis MD  Discharge Physician: Leticia Sibley DO    Code Status: Full Code     Admit Date: 2022   Discharge Date: 2022     Disposition:  Home [facility]    Discharge Diagnoses: Active Hospital Problems    Diagnosis Date Noted    Chronic heart failure with preserved ejection fraction (HFpEF) ([facility]) [I50.32]      Priority: Medium    Acute on chronic respiratory failure with hypoxia ([facility]) [J96.21]      Priority: Medium    Pneumonia [J18.9] 2022     Priority: Medium       Outpatient to do list:     1) Follow-up appointments:    Primary care provider  Cardiology  Pulmonology    Condition at Discharge: Stable    Hospital Course:     Pneumonia, likely gram-positive, community-acquired:  Rapid flu ruled out.  COVID-19 negative. procal mildly elevated 0.60  respiratory culture no result at this time  cont with ceftriaxone azithromycin  Complete 7 day course Abx on discharge     COPD with chronic hypoxic respiratory failure:  Continue home inhalers trelegy and duonebs     Tachycardia: This is consistent with sepsis and PNA.  CTA ruled out PE.   On metoprolol  Sinus and PAT     Chronic diastolic CHF:  Patient is due for echocardiogram as outpatient, however cardiology recommends to hold off given acute setting and tachycardia. Patient would like to see if she can get it before she leaves if HR improves.    Cardiology following  On metoprolol on daily lasix     Additional findings or notes to primary provider:  None at this time    Discharge Medications:   Current Discharge Medication List        Current Discharge Medication List        Current Discharge Medication List      CONTINUE these medications which have NOT CHANGED    Details   Nebulizers (COMPRESSOR/NEBULIZER) MISC 1 Device by Does not apply route daily  Qty: 1 each, Refills: 0 spironolactone (ALDACTONE) 25 MG tablet Take 1 tablet by mouth daily  Qty: 90 tablet, Refills: 0      ipratropium-albuterol (DUONEB) 0.5-2.5 (3) MG/3ML SOLN nebulizer solution inhale one vial (3ml) via nebulizer every 6 hours  Qty: 360 mL, Refills: 5      isosorbide dinitrate (ISORDIL) 30 MG tablet TAKE ONE TABLET BY MOUTH DAILY  Qty: 90 tablet, Refills: 3      Fluticasone-Umeclidin-Vilant (TRELEGY ELLIPTA) 200-62.5-25 MCG/INH AEPB Inhale 1 puff into the lungs daily  Qty: 3 each, Refills: 3      nitroGLYCERIN (NITROSTAT) 0.4 MG SL tablet Place 0.4 mg under the tongue every 5 minutes as needed for Chest pain up to max of 3 total doses. If no relief after 1 dose, call 911.      aspirin 81 MG EC tablet Take by mouth      lisinopril (PRINIVIL;ZESTRIL) 5 MG tablet Take 5 mg by mouth at bedtime       metoprolol tartrate (LOPRESSOR) 25 MG tablet TAKE ONE-HALF TABLET BY MOUTH TWICE DAILY      OXYGEN 3 L       simvastatin (ZOCOR) 40 MG tablet TAKE ONE TABLET BY MOUTH AT BEDTIME      magnesium oxide (MAG-OX) 400 MG tablet Take 400 mg by mouth daily      furosemide (LASIX) 20 MG tablet Take 1-2 tablets by mouth daily  Qty: 180 tablet, Refills: 0           Current Discharge Medication List          Discharge ROS:  A complete review of systems was asked and negative. Discharge Exam:    /66   Pulse 94   Temp 97.7 °F (36.5 °C) (Oral)   Resp 17   Ht 5' (1.524 m)   Wt 111 lb 4 oz (50.5 kg)   SpO2 95%   BMI 21.73 kg/m²   General appearance:  NAD  HEENT:   Normal cephalic, atraumatic, moist mucous membranes, no oropharyngeal erythema or exudate  Neck: Supple, trachea midline, no anterior cervical or SC LAD  Heart[de-identified] Normal s1/s2, RRR, no murmurs, gallops, or rubs.  No leg edema  Lungs: Diminished bilaterally  Abdomen: Soft, non-tender, non-distended, bowel sounds present, no masses  Musculoskeletal:  No clubbing, no cyanosis, or edema  Skin: No lesion or masses  Neurologic:  Neurovascularly intact without any focal sensory/motor deficits. Cranial nerves: II-XII intact, grossly non-focal.  Psychiatric:  Alert and oriented, thought content appropriate    Labs: For convenience and continuity at follow-up the following most recent labs are provided:    Lab Results   Component Value Date/Time    WBC 14.6 07/01/2022 06:28 AM    HGB 9.8 07/01/2022 06:28 AM    HCT 29.6 07/01/2022 06:28 AM    MCV 91.2 07/01/2022 06:28 AM     07/01/2022 06:28 AM     07/01/2022 06:28 AM    K 4.1 07/01/2022 06:28 AM    K 3.9 06/30/2022 10:21 AM    CL 95 07/01/2022 06:28 AM    CO2 30 07/01/2022 06:28 AM    BUN 21 07/01/2022 06:28 AM    CREATININE <0.5 07/01/2022 06:28 AM    CALCIUM 8.5 07/01/2022 06:28 AM    ALKPHOS 100 06/27/2022 11:04 AM    ALT 60 06/27/2022 11:04 AM    AST 51 06/27/2022 11:04 AM    BILITOT 0.7 06/27/2022 11:04 AM    BILIDIR <0.2 06/27/2022 11:04 AM    LABALBU 3.0 06/27/2022 11:04 AM     No results found for: INR    Radiology:  CT CHEST PULMONARY EMBOLISM W CONTRAST    Result Date: 6/27/2022  EXAM: CT CHEST PULMONARY EMBOLUS PROTOCOL ON 6/27/2022 INDICATION: Shortness of breath COMPARISON: CT chest 4/20/2022 TECHNIQUE: Multidetector CT imaging was obtained through the chest using CT pulmonary angiogram protocol. Axial images, multiplanar reformatted images and maximum intensity projection images were reviewed. Dose modulation, iterative reconstruction and/or weight based adjustments of the mA/kV were utilized to reduce radiation dose to as low as reasonably achievable IV Contrast Media and volume: 80 cc Isovue 370 FINDINGS: : No additional abnormality DIAGNOSTIC QUALITY: Adequate. PULMONARY EMBOLI: The main pulmonary areas enhance normally and contrast material. No filling defects are identified. Segmental and subsegmental arteries show no evidence of acute or chronic emboli. RIGHT HEART STRAIN: Signs present. RV/LV ratio 1.1 LUNGS AND AIRWAYS: Central airways are patent.   There is extensive airspace disease in the left upper lobe and lingula. This is new when compared to the previous examination. There is severe centrilobular emphysema with mild bronchiectasis and bronchial wall thickening. Nodular is a identified previously in the left lung aren't secured by airspace disease. These subpleural nodule in the right upper lobe posteriorly seen previously is again identified on series 606, image 37. The subpleural nodule laterally in the right lower lobe is again identified and unchanged on series 607, image 86. [employer] PLEURA: Trace left-sided effusion visualized. HEART / GREAT VESSELS: The heart is enlarged. Coronary artery calcification is extensive. The aorta is normal in caliber measuring 3.1 x 3.0 cm in AP and transverse dimension. The pulmonary outflow tract measures 3.6 cm in transverse dimension in the right pulmonary artery measures 4.4 cm in AP dimension. The left pulmonary artery measures 3.6 cm in transverse dimension. ADENOPATHY: No definite mediastinal, hilar or axillary lymphadenopathy can be demonstrated on this exam. CHEST WALL / LOWER NECK: No significant abnormality. UPPER ABDOMEN: Images to the upper abdomen are limited. Adrenal hyperplasia is noted. This is unchanged. BONES: No significant abnormality. OTHER FINDINGS: None. No evidence of pulmonary embolus. Extensive airspace disease in the left upper lobe and lingula with trace left-sided effusion. Findings consistent with pulmonary arterial hypertension. Extensive coronary artery calcification noted. Nodules described in the right lung are stable when compared to the previous examination that the nodules in the left lung are scattered by airspace disease. Follow-up after clearing of infiltrate is recommended with CT of the chest to ensure nodule stability. The patient was seen and examined on day of discharge and this discharge summary is in conjunction with any daily progress note from day of discharge. Time Spent on discharge is more than 30 minutes in the examination, evaluation, counseling and review of medications and discharge plan. Cem Chao DO   7/1/2022      Thank you Dia Joyner MD for the opportunity to be involved in this patient's care. If you have any questions or concerns please feel free to contact me at perfectserve.

## 2022-07-01 NOTE — DISCHARGE INSTR - COC
Continuity of Care Form    Patient Name: Young Freitas   :  1945  MRN:  [id number]    Admit date:  2022  Discharge date:  ***    Code Status Order: Full Code   Advance Directives:      Admitting Physician:  Deb Goyal MD  PCP: Adri Nunez MD    Discharging Nurse: [facility] Unit/Room#: 5981/3062-81  Discharging Unit Phone Number: ***    Emergency Contact:   Extended Emergency Contact Information  Primary Emergency Contact: Sandra Neil  Address: [address] [facility], [address] Phone: [phone]  Relation: Other  Secondary Emergency Contact: Craig Neil S [facility] Phone: [phone]  Relation: Child    Past Surgical History:  Past Surgical History:   Procedure Laterality Date    ANKLE FRACTURE SURGERY Left     HIP FRACTURE SURGERY Left     PERIPHERAL PERCUTANEOUS ARTERIAL INTERVENTION Right 2010    lower       Immunization History:   Immunization History   Administered Date(s) Administered    COVID-19, PFIZER PURPLE top, DILUTE for use, (age 15 y+), 30mcg/0.3mL 2021, 2021, 2021, 10/03/2021    Influenza Virus Vaccine 10/20/2021    Pneumococcal Vaccine 10/20/2021       Active Problems:  Patient Active Problem List   Diagnosis Code    Primary hypertension I10    Other emphysema ([facility] [address] J43.8    Pure hypercholesterolemia E78.00    PVD (peripheral vascular disease) ([facility] [address] I73.9    Age-related osteoporosis without current pathological fracture M81.0    History of smoking 30 or more pack years Z87.891    Pneumonia J18.9    Acute on chronic respiratory failure with hypoxia (HCC) J96.21    Chronic heart failure with preserved ejection fraction (HFpEF) ([facility]) I50.32       Isolation/Infection:   Isolation            No Isolation          Patient Infection Status       Infection Onset Added Last Indicated Last Indicated By Review Planned Expiration Resolved Resolved By    None active    Resolved    COVID-19 oxygen:50230}  Ventilator:    {MH CC Vent HHMW:817916014}    Heart Failure Instructions for Daily Management  Patient was treated for {Kaur Single:03123::\"acute\",\"chronic\",\"acute on chronic\"} {Kaur OXLXUVVO:38199::\"GOAFLXHR\",\"KOTYNGDZL\",\"NMYZMKTL systolic and diastolic\"} heart failure. she  will require the following:    Please weigh daily on the same scale and approximately the same time of day. Report weight gain of 3 pounds/day or 5 pounds/week to : Florentin Watson [phone]. Please use hospital discharge weight as baseline reference. Please monitor for signs and symptoms of and report to MD:  Worsening Heart Failure: sudden weight gain, shortness of breath, lower extremity or general edema/swelling, abdominal bloating/swelling, inability to lie flat, intolerance to usual activity, or cough (especially at night). Report these finding even if no increase in weight. Dehydration:  having difficulty or a decrease in urination, dizziness, worsening fatigue, or new onset/worsening of generalized weakness. Please continue a LOW SODIUM diet and LIMIT fluid intake to 48 - 64 ounces ( 1.5 - 2 liters) per day. Call Florentin Watson [phone] with any questions or concerns. Please continue heart failure education to patient and family/support system. See After Visit Summary for hospital follow up appointment details. Consider spiritual care referral for support and/or completion of advance directives . Consider: [place] [address] telehealth program if patient agreeable and able to participate.   Patient's primary cardiologist is  [facility]       Rehab Therapies: {THERAPEUTIC INTERVENTION:3312644150}  Weight Bearing Status/Restrictions: [address]ERNER Weight Bearin}  Other Medical Equipment (for information only, NOT a DME order):  {EQUIPMENT:027880397}  Other Treatments: ***    Patient's personal belongings (please select all that are sent with patient):  {CHP DME Belongings:836959051}    RN SIGNATURE:  {Esignature:324617589}    CASE MANAGEMENT/SOCIAL WORK SECTION    Inpatient Status Date: 06/27/2022    Readmission Risk Assessment Score:  Readmission Risk              Risk of Unplanned Readmission:  14           Discharging to Facility/ Agency     [address] [facility], [address] [place] [address]       Phone: [phone]       Fax: [phone]          Dialysis Facility (if applicable)   NA  Name:  Address:  Dialysis Schedule:  Phone:  Fax:    / signature: Electronically signed by Scott Figueroa RN on 7/1/22 at 4:06 PM EDT    PHYSICIAN SECTION    Prognosis: {Prognosis:6526258913}    Condition at Discharge: [address] [place] [address] Patient Condition:791335846}    Rehab Potential (if transferring to Rehab): {Prognosis:4337843489}    Recommended Labs or Other Treatments After Discharge: ***    Physician Certification: I certify the above information and transfer of Thomas Hwang  is necessary for the continuing treatment of the diagnosis listed and that she requires {Admit to Appropriate Level of Care:99227} for {GREATER/LESS:306974471} 30 days.      Update Admission H&P: {CHP DME Changes in YPRWA:812834921}    PHYSICIAN SIGNATURE:  {Esignature:589371500}

## 2022-07-01 NOTE — PLAN OF CARE
Problem: Respiratory - Adult  Goal: Achieves optimal ventilation and oxygenation  Outcome: Progressing  Note: Pt on 3L/min NC at this time with SpO2 above 93%. This is patient's at home oxygen. Problem: Cardiovascular - Adult  Goal: Absence of cardiac dysrhythmias or at baseline  Outcome: Progressing  Note: Pt has been normal sinus rhythm during shift. Will continue to monitor. Problem: Safety - Adult  Goal: Free from fall injury  Outcome: Adequate for Discharge  Note: Pt remained free from falls during shift. Pt's bed is locked in lowest position with alarm on, call light, bedside table, and personal belongings within reach.

## 2022-07-01 NOTE — PLAN OF CARE
Problem: Discharge Planning  Goal: Discharge to home or other facility with appropriate resources  Outcome: Adequate for Discharge     Problem: Safety - Adult  Goal: Free from fall injury  7/1/2022 1434 by Ruben Navarro RN  Outcome: Adequate for Discharge  7/1/2022 0749 by Rio Trinidad RN  Outcome: Adequate for Discharge  Note: Pt remained free from falls during shift. Pt's bed is locked in lowest position with alarm on, call light, bedside table, and personal belongings within reach. Problem: Respiratory - Adult  Goal: Achieves optimal ventilation and oxygenation  7/1/2022 1434 by Ruben Navarro RN  Outcome: Adequate for Discharge  7/1/2022 0749 by Rio Trinidad RN  Outcome: Progressing  Note: Pt on 3L/min NC at this time with SpO2 above 93%. This is patient's at home oxygen. Problem: Cardiovascular - Adult  Goal: Maintains optimal cardiac output and hemodynamic stability  Outcome: Adequate for Discharge  Goal: Absence of cardiac dysrhythmias or at baseline  7/1/2022 1434 by Ruben Navarro RN  Outcome: Adequate for Discharge  7/1/2022 0749 by Rio Trinidad RN  Outcome: Progressing  Note: Pt has been normal sinus rhythm during shift. Will continue to monitor.      Problem: Metabolic/Fluid and Electrolytes - Adult  Goal: Electrolytes maintained within normal limits  Outcome: Adequate for Discharge  Goal: Hemodynamic stability and optimal renal function maintained  Outcome: Adequate for Discharge     Problem: Chronic Conditions and Co-morbidities  Goal: Patient's chronic conditions and co-morbidity symptoms are monitored and maintained or improved  Outcome: Adequate for Discharge     Problem: Pain  Goal: Verbalizes/displays adequate comfort level or baseline comfort level  Outcome: Adequate for Discharge

## 2022-07-01 NOTE — PROGRESS NOTES
Patient discharged to home. Discharge home with daughter in law. Discharge instructions reviewed, patient verbalized understanding. Patient belongings taken at discharge including portable oxygen, cell phone and . Patient Iv removed prior to discharge.

## 2022-07-01 NOTE — PROGRESS NOTES
Progress Note  PGY-1    Admit Date: 6/27/2022  ICU Day: Hospital Day: 5        Interval history:  No acute events overnight. The patient denies fevers, chills, chest pain, shortness of breath, nausea, vomiting, diarrhea, or constipation. Improvement in cough, shortness of breath and increase in energy and appetite. Medications:   Scheduled Meds:   furosemide  40 mg IntraVENous TID    spironolactone  25 mg Oral Daily    metoprolol tartrate  25 mg Oral BID    guaiFENesin  600 mg Oral BID    Arformoterol Tartrate  15 mcg Nebulization BID    ipratropium-albuterol  1 ampule Inhalation TID    sodium chloride flush  5-40 mL IntraVENous 2 times per day    enoxaparin  40 mg SubCUTAneous Daily    cefTRIAXone (ROCEPHIN) IV  1,000 mg IntraVENous Q24H    azithromycin  500 mg IntraVENous Q24H    aspirin  81 mg Oral Daily    magnesium oxide  400 mg Oral Daily    atorvastatin  40 mg Oral Nightly       Continuous Infusions:   sodium chloride         PRN Meds:  sodium chloride flush, sodium chloride, ondansetron **OR** ondansetron, polyethylene glycol, acetaminophen **OR** acetaminophen, perflutren lipid microspheres    Vital/I&O/Physical examination:   VS:  /67   Pulse 96   Temp 97.9 °F (36.6 °C) (Oral)   Resp 17   Ht 5' (1.524 m)   Wt 111 lb 4 oz (50.5 kg)   SpO2 90%   BMI 21.73 kg/m²     I/O:    Intake/Output Summary (Last 24 hours) at 7/1/2022 1057  Last data filed at 7/1/2022 1042  Gross per 24 hour   Intake 630 ml   Output 1750 ml   Net -1120 ml       PE:  Physical Exam  HENT:      Head: Normocephalic and atraumatic. Nose: Nose normal.   Eyes:      Extraocular Movements: Extraocular movements intact. Pupils: Pupils are equal, round, and reactive to light. Cardiovascular:      Pulses: Normal pulses. Pulmonary:      Breath sounds: Normal breath sounds. Abdominal:      Palpations: Abdomen is soft. Musculoskeletal:      Cervical back: Normal range of motion.    Skin:     General: Skin is warm. Neurological:      Mental Status: She is alert. The patient feels warm. Labs & Imaging:   LABS:  Renal:   Recent Labs     06/29/22  0831 06/30/22  1021 07/01/22  0628   * 135* 137   K 5.2* 3.9 4.1   CL 97* 94* 95*   CO2 24 29 30   BUN 23* 20 21*   CREATININE 0.6 0.6 <0.5*   GLUCOSE 87 160* 83   ANIONGAP 13 12 12     CBC:   Recent Labs     06/29/22  0831 06/30/22  1021 07/01/22  0628   WBC 16.6* 17.3* 14.6*   HGB 10.9* 10.6* 9.8*   HCT 33.6* 32.8* 29.6*    330 298   MCV 92.0 91.6 91.2                            Hepatic: No results for input(s): AST, ALT, ALB, BILITOT, ALKPHOS in the last 72 hours. Troponin: No results for input(s): TROPONINI in the last 72 hours. BNP: No results for input(s): BNP in the last 72 hours. Lipids: No results for input(s): CHOL, HDL in the last 72 hours. Invalid input(s): LDLCALCU, TRIGLYCERIDE  INR: No results for input(s): INR in the last 72 hours. Lactate: No results for input(s): LACTATE in the last 72 hours. ABGs:No results for input(s): PHART, JRD4DGB, PO2ART, GPU3JDZ, BEART, THGBART, P2BNQNWD, OPG2PSG in the last 72 hours. UA:No results for input(s): NITRITE, COLORU, PHUR, LABCAST, WBCUA, RBCUA, MUCUS, TRICHOMONAS, YEAST, BACTERIA, CLARITYU, SPECGRAV, LEUKOCYTESUR, UROBILINOGEN, BILIRUBINUR, BLOODU, GLUCOSEU, AMORPHOUS in the last 72 hours. Invalid input(s): KETONESU     IMAGING:  XR CHEST PORTABLE   Final Result   1. No interval change in extensive left upper lobe lobar pneumonia   2. Central lobar emphysema      CT CHEST PULMONARY EMBOLISM W CONTRAST   Final Result      No evidence of pulmonary embolus. Extensive airspace disease in the left upper lobe and lingula with trace left-sided effusion. Findings consistent with pulmonary arterial hypertension. Extensive coronary artery calcification noted.       Nodules described in the right lung are stable when compared to the previous examination that the nodules in the left lung are scattered by airspace disease. Follow-up after clearing of infiltrate is recommended with CT of the chest to ensure nodule    stability. XR CHEST (2 VW)   Final Result   Impression: Increased left-sided pulmonary airspace opacity, as above. Assessment & Plan:    Jn Mar is a 78 y.o. female with PMH of COPD, CHF, Hypertension, Hypercholestrolemia and Peripheral Vascular Disease who was admitted to ED with low energy level and low appetite on Monday and was diagnosed with Pneumonia and Sepsis. Acute Hypercapnic respiratory failure 2/2 Community Acquired Pneumonia:  Hx of COPD  Pt was on baseline Oxygen 3LSat O2 at 97,VBG, 7.363/63. 3. Patient was admitted for Pneumonia as she had fever and cough. BUN 29. CXR showed left lower lobe considation. Plan    -Moxifloxacin to complete a 7 day course   -Continue home Oxygen  -Resume COPD home meds        Code Status: Full Code   ADULT DIET;  Regular; Low Fat/Low Chol/High Fiber/2 gm Na            Pancho Santiago MD, PGY- 1  Contact via [employer]  7/1/2022,  10:57 AM

## 2022-07-02 LAB
CULTURE, RESPIRATORY: NORMAL
EKG ATRIAL RATE: 108 BPM
EKG DIAGNOSIS: NORMAL
EKG P AXIS: 65 DEGREES
EKG P-R INTERVAL: 136 MS
EKG Q-T INTERVAL: 334 MS
EKG QRS DURATION: 82 MS
EKG QTC CALCULATION (BAZETT): 447 MS
EKG R AXIS: 93 DEGREES
EKG T AXIS: 79 DEGREES
EKG VENTRICULAR RATE: 108 BPM
GRAM STAIN RESULT: NORMAL

## 2022-07-02 PROCEDURE — 93010 ELECTROCARDIOGRAM REPORT: CPT | Performed by: INTERNAL MEDICINE

## 2022-07-06 ENCOUNTER — TELEPHONE (OUTPATIENT)
Dept: CARDIOLOGY CLINIC | Age: 77
End: 2022-07-06

## 2022-07-06 NOTE — TELEPHONE ENCOUNTER
Lien Greenlin patient's daughter in law called in stating that patient had just been discharge from the hospital on Friday July 1 from having pneumonia. Lien Greenlin stated that when they were being discharged they were told Mrs. Roberto Philippe needed to follow up with her Cardiologist.    Lien Ventura stated that the patient has an appt to see DEB on July 28, and is wondering is it neccessary for that patient to come in before then, or can they just do a phone call updating her how the patientqqqqq is doing. Lien Ventura states if it is necessary for Mrs. Roberto Philippe to come .     Please contact Lien Ventura [phone]

## 2022-07-06 NOTE — TELEPHONE ENCOUNTER
Patient hospitalized at [facility] from 6/27 through 7/1. Does she need to be seen by cardiology in the next 7 days. If so, who should she see?

## 2022-07-06 NOTE — TELEPHONE ENCOUNTER
Please schedule an ECHO at 8:30 on 7/28/22 which is before her OV with DEB. This is per office note from 5/4/22 where she needs an ECHO and OV same day. I've already spoken to her DIL Delia Jerez so no need to call the patient. Thank you.

## 2022-07-06 NOTE — TELEPHONE ENCOUNTER
Echo at 8:30 already taken by DEB 10:00 am pt, there is a 7:30am or 10:30am     Pls advise which would be best for the pt ?

## 2022-07-07 ENCOUNTER — TELEPHONE (OUTPATIENT)
Dept: FAMILY MEDICINE CLINIC | Age: 77
End: 2022-07-07

## 2022-07-07 DIAGNOSIS — J18.9 PNEUMONIA DUE TO INFECTIOUS ORGANISM, UNSPECIFIED LATERALITY, UNSPECIFIED PART OF LUNG: ICD-10-CM

## 2022-07-07 DIAGNOSIS — J18.9 PNEUMONIA DUE TO INFECTIOUS ORGANISM, UNSPECIFIED LATERALITY, UNSPECIFIED PART OF LUNG: Primary | ICD-10-CM

## 2022-07-07 LAB
ANION GAP SERPL CALCULATED.3IONS-SCNC: 17 MMOL/L (ref 3–16)
BASOPHILS ABSOLUTE: 0 K/UL (ref 0–0.2)
BASOPHILS RELATIVE PERCENT: 0.1 %
BUN BLDV-MCNC: 45 MG/DL (ref 7–20)
CALCIUM SERPL-MCNC: 10.9 MG/DL (ref 8.3–10.6)
CHLORIDE BLD-SCNC: 79 MMOL/L (ref 99–110)
CO2: 40 MMOL/L (ref 21–32)
CREAT SERPL-MCNC: 1 MG/DL (ref 0.6–1.2)
EOSINOPHILS ABSOLUTE: 0 K/UL (ref 0–0.6)
EOSINOPHILS RELATIVE PERCENT: 0 %
GFR AFRICAN AMERICAN: >60
GFR NON-AFRICAN AMERICAN: 54
GLUCOSE BLD-MCNC: 105 MG/DL (ref 70–99)
HCT VFR BLD CALC: 35.6 % (ref 36–48)
HEMOGLOBIN: 11.5 G/DL (ref 12–16)
LYMPHOCYTES ABSOLUTE: 0.7 K/UL (ref 1–5.1)
LYMPHOCYTES RELATIVE PERCENT: 4.4 %
MCH RBC QN AUTO: 29.9 PG (ref 26–34)
MCHC RBC AUTO-ENTMCNC: 32.4 G/DL (ref 31–36)
MCV RBC AUTO: 92.4 FL (ref 80–100)
MONOCYTES ABSOLUTE: 0.7 K/UL (ref 0–1.3)
MONOCYTES RELATIVE PERCENT: 4 %
NEUTROPHILS ABSOLUTE: 15.4 K/UL (ref 1.7–7.7)
NEUTROPHILS RELATIVE PERCENT: 91.5 %
PDW BLD-RTO: 13.7 % (ref 12.4–15.4)
PLATELET # BLD: 593 K/UL (ref 135–450)
PMV BLD AUTO: 7.7 FL (ref 5–10.5)
POTASSIUM SERPL-SCNC: 5.1 MMOL/L (ref 3.5–5.1)
RBC # BLD: 3.85 M/UL (ref 4–5.2)
SODIUM BLD-SCNC: 136 MMOL/L (ref 136–145)
WBC # BLD: 16.8 K/UL (ref 4–11)

## 2022-07-07 NOTE — TELEPHONE ENCOUNTER
Nam Boone,  I wanted to touch base with you about Chelsie Michael. She was admitted to [facility] last Monday with pneumonia. She was discharged Friday night. She just has not been well since. We knew it would take awhile for her to bounce back, but she really hasn't eaten much at all for the last week. She has been drinking water. She's had a lot of vomiting but we thought that was due to the antibiotics. She finished those 2 days ago, but is still complaining of bad nausea and now some reflux. We've been giving her zofran and prilosec, but hasn't seemed to help. No breathing problems. Just wanted to check in with you to make sure this is the normal course of recovery. Nadege Castano is wondering if she is maybe developing an ileus. Is there anything we should do at this point, or just give more time? We are both scheduled to be out of town tomorrow night, but a little worried about leaving her and not sure what to do.   Thanks,  Rodrigo Palacio

## 2022-07-07 NOTE — TELEPHONE ENCOUNTER
Please call Lynda Caicedo and tell her I would like her Oneill Pronto to have labs done to make sure her kidneys and electrolytes are ok. Work her in to see me tomorrow.   Thanks

## 2022-07-08 ENCOUNTER — OFFICE VISIT (OUTPATIENT)
Dept: FAMILY MEDICINE CLINIC | Age: 77
End: 2022-07-08
Payer: MEDICARE

## 2022-07-08 VITALS
RESPIRATION RATE: 18 BRPM | SYSTOLIC BLOOD PRESSURE: 98 MMHG | DIASTOLIC BLOOD PRESSURE: 60 MMHG | BODY MASS INDEX: 20.82 KG/M2 | OXYGEN SATURATION: 91 % | TEMPERATURE: 97.3 F | HEART RATE: 80 BPM | WEIGHT: 106.6 LBS

## 2022-07-08 DIAGNOSIS — R11.0 NAUSEA: ICD-10-CM

## 2022-07-08 DIAGNOSIS — E86.0 DEHYDRATION: ICD-10-CM

## 2022-07-08 DIAGNOSIS — J18.9 PNEUMONIA OF LEFT LOWER LOBE DUE TO INFECTIOUS ORGANISM: Primary | ICD-10-CM

## 2022-07-08 DIAGNOSIS — J43.8 OTHER EMPHYSEMA (HCC): ICD-10-CM

## 2022-07-08 PROCEDURE — 1123F ACP DISCUSS/DSCN MKR DOCD: CPT | Performed by: FAMILY MEDICINE

## 2022-07-08 PROCEDURE — 99214 OFFICE O/P EST MOD 30 MIN: CPT | Performed by: FAMILY MEDICINE

## 2022-07-08 RX ORDER — ONDANSETRON 4 MG/1
4 TABLET, FILM COATED ORAL 3 TIMES DAILY PRN
Qty: 15 TABLET | Refills: 1 | Status: SHIPPED | OUTPATIENT
Start: 2022-07-08

## 2022-07-08 NOTE — PROGRESS NOTES
Subjective:      Patient ID: Jarad Nelson 78 y.o. female. The primary encounter diagnosis was Pneumonia of left lower lobe due to infectious organism. A diagnosis of Other emphysema ([address] was also pertinent to this visit. JOSEPH Lee was hospitalized from 6/27 - 7/1/22 with LLL pneumonia, acute on chronic resp failure with hypoxia and chronic HFpEF. Flu and COVID tests were neg. Definitive organism not indentified. 7 d course antibx:  Ceftriaxone and azithromycin in hospital and discharge on Augmentin. Since discharge she has had a poor appetite and nausea. Her appetite is poor. Had a a lot of nausea, that was from Augmentin. Finished it on Monday. Is still very nauseated. Constipation - no BM x 2 days. Had small BM 3 days ago. Abdomen feels hard but not painful. Cough has resolved and the dyspnea is at baseline. Never had increase in dyspnea or cough with pneumonia. No fever. Is drinking a few glasses of water or ginger ale every day. Is not feeling light headed or dizzy. Is eating very little. Has Ensure - does not like sweet drinks. Rx: Prilosec x 2 days - is helping. Took Senna yesterday and did not help.        Lab Results   Component Value Date     07/07/2022    K 5.1 07/07/2022    CL 79 (L) 07/07/2022    CO2 40 (H) 07/07/2022    BUN 45 (H) 07/07/2022    CREATININE 1.0 07/07/2022    GLUCOSE 105 (H) 07/07/2022    CALCIUM 10.9 (H) 07/07/2022    PROT 6.8 06/27/2022    LABALBU 3.0 (L) 06/27/2022    BILITOT 0.7 06/27/2022    ALKPHOS 100 06/27/2022    AST 51 (H) 06/27/2022    ALT 60 (H) 06/27/2022    LABGLOM 54 (A) 07/07/2022    GFRAA >60 07/07/2022    AGRATIO 1.6 05/11/2022        Lab Results   Component Value Date    WBC 16.8 (H) 07/07/2022    HGB 11.5 (L) 07/07/2022    HCT 35.6 (L) 07/07/2022    MCV 92.4 07/07/2022     (H) 07/07/2022     TSH   Date Value Ref Range Status   06/27/2022 0.58 0.27 - 4.20 uIU/mL Final   11/22/2021 0.98 0.27 - 4.20 uIU/mL Final Outpatient Medications Marked as Taking for the 7/8/22 encounter (Office Visit) with Ciara Crespo MD   Medication Sig Dispense Refill    metoprolol tartrate (LOPRESSOR) 25 MG tablet Take 1 tablet by mouth 2 times daily 60 tablet 3    guaiFENesin (MUCINEX) 600 MG extended release tablet Take 1 tablet by mouth 2 times daily      furosemide (LASIX) 20 MG tablet Take 1 tablet by mouth daily 30 tablet 0    Nebulizers (COMPRESSOR/NEBULIZER) MISC 1 Device by Does not apply route daily 1 each 0    spironolactone (ALDACTONE) 25 MG tablet Take 1 tablet by mouth daily 90 tablet 0    ipratropium-albuterol (DUONEB) 0.5-2.5 (3) MG/3ML SOLN nebulizer solution inhale one vial (3ml) via nebulizer every 6 hours 360 mL 5    Fluticasone-Umeclidin-Vilant (TRELEGY ELLIPTA) 200-62.5-25 MCG/INH AEPB Inhale 1 puff into the lungs daily (Patient taking differently: Inhale 1 puff into the lungs daily Takes at night) 3 each 3    nitroGLYCERIN (NITROSTAT) 0.4 MG SL tablet Place 0.4 mg under the tongue every 5 minutes as needed for Chest pain up to max of 3 total doses. If no relief after 1 dose, call 911.       aspirin 81 MG EC tablet Take by mouth      OXYGEN 3 L       simvastatin (ZOCOR) 40 MG tablet TAKE ONE TABLET BY MOUTH AT BEDTIME      magnesium oxide (MAG-OX) 400 MG tablet Take 400 mg by mouth daily          No Known Allergies    Patient Active Problem List   Diagnosis    Primary hypertension    Other [facility]ma [facility]    Pure hypercholesterolemia    [facility]    Age-related osteoporosis without current pathological fracture    History of smoking 30 or more pack years    Pneumonia    Acute on chronic respiratory failure with hypoxia (HCC)    Chronic heart failure with preserved ejection fraction (HFpEF) ([facility])       Past Medical History:   Diagnosis Date    Age-related osteoporosis without current pathological fracture 77/99/3099    Diastolic CHF (HCC)     Other emphysema ([facility] 2021    Primary hypertension 2021    Pure hypercholesterolemia 2021    PVD (peripheral vascular disease) ([facility] 2021       Past Surgical History:   Procedure Laterality Date    ANKLE FRACTURE SURGERY Left     HIP FRACTURE SURGERY Left     PERIPHERAL PERCUTANEOUS ARTERIAL INTERVENTION Right 2010    lower        Family History   Problem Relation Age of Onset    Heart Attack Mother 46    Heart Failure Mother     Heart Failure Father 80    Coronary Art Dis Brother        Social History     Tobacco Use    Smoking status: Former Smoker     Packs/day: 1.00     Years: 40.00     Pack years: 40.00     Start date:      Quit date:      Years since quittin.5    Smokeless tobacco: Never Used   Vaping Use    Vaping Use: Never used   Substance Use Topics    Alcohol use: Yes     Alcohol/week: 7.0 standard drinks     Types: 7 Standard drinks or equivalent per week     Comment: one daily    Drug use: Never            Review of Systems  Review of Systems    Objective:   Physical Exam  Vitals:    22 1124   BP: 98/60   Pulse: 80   Resp: 18   Temp: 97.3 °F (36.3 °C)   TempSrc: Temporal   SpO2: 91%   Weight: 106 lb 9.6 oz (48.4 kg)     Wt Readings from Last 3 Encounters:   22 106 lb 9.6 oz (48.4 kg)   22 111 lb 4 oz (50.5 kg)   22 109 lb (49.4 kg)        Physical Exam  .chronilly ill appearing. No respiratory distress. On oxygen  Skin is warm and dry. Turgor is decreased. No cervical, supraclavicular or submandibular LNS. Lungs left lower lobe decreased breath sounds. No wheeze or rales. Heart regular with normal rate, no murmer or gallop   Ext no c/c/e      Assessment:       Diagnosis Orders   1. Pneumonia of left lower lobe due to infectious organism  Clinically improving but dehydrated due to nausea and poor appetite   2. Other emphysema ([facility]     3. Dehydration     4.  Nausea  ondansetron (ZOFRAN) 4 MG tablet          Plan:      Dispatch [facility] consulted for home IVF. Start PRN Zofran. Push fluids. Try [employer] with whole milk instead of Ensure. Keep appt with Dr. Irina Sparks on 7/13 and follow up with me in 3 to 4 weeks or prn.

## 2022-07-11 ENCOUNTER — TELEPHONE (OUTPATIENT)
Dept: FAMILY MEDICINE CLINIC | Age: 77
End: 2022-07-11

## 2022-10-15 NOTE — TELEPHONE ENCOUNTER
Requested Prescriptions     Pending Prescriptions Disp Refills    alendronate (FOSAMAX) 70 MG tablet 4 tablet      Sig: TAKE ONE TABLET BY MOUTH ONCE A WEEK       LOV 11/22/2021  NOV 12/15/21  Labs 11/22/21 FAMILY HISTORY:  Father  Still living? Unknown  Family history of diabetes mellitus (DM), Age at diagnosis: Age Unknown  Family history of hyperlipidemia, Age at diagnosis: Age Unknown  FH: HTN (hypertension), Age at diagnosis: Age Unknown    Mother  Still living? Unknown  Family history of diabetes mellitus (DM), Age at diagnosis: Age Unknown  Family history of hyperlipidemia, Age at diagnosis: Age Unknown  FH: HTN (hypertension), Age at diagnosis: Age Unknown    Sibling  Still living? Unknown  Family history of diabetes mellitus (DM), Age at diagnosis: Age Unknown